# Patient Record
Sex: MALE | Race: BLACK OR AFRICAN AMERICAN | NOT HISPANIC OR LATINO | Employment: FULL TIME | URBAN - METROPOLITAN AREA
[De-identification: names, ages, dates, MRNs, and addresses within clinical notes are randomized per-mention and may not be internally consistent; named-entity substitution may affect disease eponyms.]

---
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
## 2018-01-12 VITALS — WEIGHT: 135.75 LBS | TEMPERATURE: 99.3 F

## 2018-01-12 NOTE — PROGRESS NOTES
Chief Complaint  He is an 23year old patient here for his PPD and HPV#9 today      Active Problems    1  Acute URI (465 9) (J06 9)    Current Meds   1  No Reported Medications Recorded    Allergies    1  No Known Drug Allergies    2   Seasonal    Plan  Encounter for immunization    · Gardasil 9 Intramuscular Suspension   · Tubersol 5 UNIT/0 1ML Intradermal Solution    Signatures   Electronically signed by : Bobby Pack MD; Jun 29 2016  4:07PM EST                       (Author)

## 2018-01-14 VITALS
WEIGHT: 137.4 LBS | RESPIRATION RATE: 16 BRPM | DIASTOLIC BLOOD PRESSURE: 85 MMHG | SYSTOLIC BLOOD PRESSURE: 125 MMHG | HEART RATE: 90 BPM | HEIGHT: 68 IN | BODY MASS INDEX: 20.82 KG/M2

## 2018-01-14 NOTE — PROGRESS NOTES
Assessment   1  No tobacco/smoke exposure  2  Encounter for preventive health examination (V70 0) (Z00 00)  3  ADD (attention deficit disorder) (314 00) (F98 8)    Plan  ADD (attention deficit disorder)    · Start: Dexmethylphenidate HCl ER 10 MG Oral Capsule Extended Release 24 Hour  (Focalin XR); TAKE 1 CAPSULE DAILY IN THE MORNING  Encounter for immunization    · 1,2 Administered:2  Gardasil 9 Intramuscular Suspension Prefilled Syringe;1 1,2      1 Amended By: Ricci Tierney; Jul 03 2017 1:53 PM EST   2 Amended By: Ricci Tierney; Jul 03 2017 2:12 PM EST    Discussion/Summary    BLOOD PRESSURE VERY SLIGHTLY ELEVATED,PATIENT DISCLOSED CONSUMPTION OF ENERGY DRINK  ENCOURAGED TO STOP CONSUMING [address]   [facility],[address]  [facility] TO ADD [facility]  Chief Complaint  21YEAR OLD PATIENT PRESENT FOR WELL VISIT  PER MOM HAS CONCERNS ABOUT ADD MEDICATION  History of Present Illness  HM, Adult Male: The patient is being seen for a health maintenance evaluation  General Health: The patient's health since the last visit is described as good  Lifestyle:  He consumes a diverse and healthy diet  He exercises regularly  He does not use tobacco    Screening:      Review of Systems    Constitutional: No fever or chills, feels well, no tiredness, no recent weight gain or weight loss  Eyes: No complaints of eye pain, no red eyes, no discharge from eyes, no itchy eyes  ENT: no complaints of earache, no hearing loss, no nosebleeds, no nasal discharge, no sore throat, no hoarseness  Cardiovascular: No complaints of slow heart rate, no fast heart rate, no chest pain, no palpitations, no leg claudication, no lower extremity  Respiratory: No complaints of shortness of breath, no wheezing, no cough, no SOB on exertion, no orthopnea or PND     Gastrointestinal: No complaints of abdominal pain, no constipation, no nausea or vomiting, no diarrhea or bloody stools  Genitourinary: No complaints of dysuria, no incontinence, no hesitancy, no nocturia, no genital lesion, no testicular pain  Musculoskeletal: No complaints of arthralgia, no myalgias, no joint swelling or stiffness, no limb pain or swelling  Integumentary: No complaints of skin rash or skin lesions, no itching, no skin wound, no dry skin  Neurological: No compliants of headache, no confusion, no convulsions, no numbness or tingling, no dizziness or fainting, no limb weakness, no difficulty walking  Psychiatric: Is not suicidal, no sleep disturbances, no anxiety or depression, no change in personality, no emotional problems  Endocrine: No complaints of proptosis, no hot flashes, no muscle weakness, no erectile dysfunction, no deepening of the voice, no feelings of weakness  Hematologic/Lymphatic: No complaints of swollen glands, no swollen glands in the neck, does not bleed easily, no easy bruising  Active Problems   1  Acute URI (465 9) (J06 9)  2  Strep throat (034 0) (J02 0)    Past Medical History    · History of Acute sinusitis (461 9) (J01 90)   · History of Acute URI (465 9) (J06 9)   · History of ADHD (attention deficit hyperactivity disorder) (314 01) (F90 9)   · History of Adopted child   · History of Allergic rhinitis (477 9) (J30 9)   · History of abdominal pain (V13 89) (W91 264)   · History of Holter monitoring (V45 89) (Z98 890)   · History of Learning problem (V40 0) (F81 9)   · History of No tobacco smoke exposure (V49 89) (Z78 9)   · History of Palpitations (785 1) (R00 2)   · History of Pharyngotonsillitis (465 8) (J03 90,J02  9)   · History of Puberty (V21 1) (Z00 3)   · History of Sprained ankle (845 00) (S93 409A)   · History of URI (upper respiratory infection) (465 9) (J06 9)   · History of Wrist pain (719 43) (M25 539)    Surgical History    · History of Hernia Repair   · History of Surgery Penis Circumcision Using Clamp/ Other Device    · History of Umbilical Hernia Repair - Child Under 5    Family History  Family History    · Adopted    Social History    · Activities: Soccer   · Adopted child   · Lives with parents   · Lives with parents ()   · Never a smoker   · No alcohol use   · No drug use   · No tobacco/smoke exposure   · Non drinker / no alcohol use   · Non-smoker (V49 89) (Z78 9)    Current Meds  1  No Reported Medications Recorded    Allergies   1  No Known Drug Allergies   2  Seasonal    Vitals   Recorded: 44WLZ3231 01:35PM Recorded: 36KTY6102 01:20PM   Heart Rate 90    Respiration 16    Systolic 389, LUE, Sitting    Diastolic 85, LUE, Sitting    Height  5 ft 8 in   Weight  137 lb 6 4 oz   BMI Calculated  20 89   BSA Calculated  1 74   BMI Percentile  25 %   2-20 Stature Percentile  29 %   2-20 Weight Percentile  23 %     Physical Exam    Constitutional   General appearance: No acute distress, well appearing and well nourished  Eyes   Conjunctiva and lids: No erythema, swelling or discharge  Pupils and irises: Equal, round, reactive to light  Ophthalmoscopic examination: Normal fundi and optic discs  Ears, Nose, Mouth, and Throat   External inspection of ears and nose: Normal     Otoscopic examination: Tympanic membranes translucent with normal light reflex  Canals patent without erythema  Hearing: Normal     Nasal mucosa, septum, and turbinates: Normal without edema or erythema  Lips, teeth, and gums: Normal, good dentition  Oropharynx: Normal with no erythema, edema, exudate or lesions  Neck   Neck: Supple, symmetric, trachea midline, no masses  Thyroid: Normal, no thyromegaly  Pulmonary   Respiratory effort: No increased work of breathing or signs of respiratory distress  Percussion of chest: Normal     Palpation of chest: Normal     Auscultation of lungs: Clear to auscultation  Cardiovascular   Palpation of heart: Normal PMI, no thrills      Auscultation of heart: Normal rate and rhythm, normal S1 and S2, no murmurs  Carotid pulses: 2+ bilaterally  Abdominal aorta: Normal     Femoral pulses: 2+ bilaterally  Pedal pulses: 2+ bilaterally  Examination of extremities for edema and/or varicosities: Normal     Chest   Breasts: Normal, no dimpling or skin changes appreciated  Palpation of breasts and axillae: Normal, no masses palpated  Chest: Normal     Abdomen   Abdomen: Non-tender, no masses  Liver and spleen: No hepatomegaly or splenomegaly  Examination for hernias: No hernias appreciated  Anus, perineum, and rectum: Normal sphincter tone, no masses, no prolapse  Stool sample for occult blood: Negative  Genitourinary   Scrotal contents: Normal testes, no masses  Penis: Normal, no lesions  Digital rectal exam of prostate: Normal size, no masses  Lymphatic   Palpation of lymph nodes in neck: No lymphadenopathy  Palpation of lymph nodes in axillae: No lymphadenopathy  Palpation of lymph nodes in groin: No lymphadenopathy  Palpation of lymph nodes in other areas: No lymphadenopathy  Musculoskeletal   Gait and station: Normal     Inspection/palpation of digits and nails: Normal without clubbing or cyanosis  Inspection/palpation of joints, bones, and muscles: Normal     Range of motion: Normal     Stability: Normal     Muscle strength/tone: Normal     Skin   Skin and subcutaneous tissue: Normal without rashes or lesions  Palpation of skin and subcutaneous tissue: Normal turgor  Neurologic   Cranial nerves: Cranial nerves 2-12 intact  Reflexes: 2+ and symmetric  Sensation: No sensory loss  Psychiatric   Judgment and insight: Normal     Orientation to person, place and time: Normal     Recent and remote memory: Intact      Mood and affect: Normal        Signatures   Electronically signed by : Jenise Halsted, MD; Jul  3 2017  2:12PM EST                       (Author)

## 2018-11-24 ENCOUNTER — OFFICE VISIT (OUTPATIENT)
Dept: URGENT CARE | Facility: CLINIC | Age: 18
End: 2018-11-24
Payer: COMMERCIAL

## 2018-11-24 ENCOUNTER — HOSPITAL ENCOUNTER (INPATIENT)
Facility: HOSPITAL | Age: 18
LOS: 3 days | Discharge: HOME | DRG: 200 | End: 2018-11-28
Attending: SURGERY | Admitting: SURGERY
Payer: COMMERCIAL

## 2018-11-24 ENCOUNTER — HOSPITAL ENCOUNTER (EMERGENCY)
Facility: HOSPITAL | Age: 18
End: 2018-11-24
Attending: EMERGENCY MEDICINE | Admitting: EMERGENCY MEDICINE
Payer: COMMERCIAL

## 2018-11-24 ENCOUNTER — APPOINTMENT (EMERGENCY)
Dept: RADIOLOGY | Facility: HOSPITAL | Age: 18
End: 2018-11-24
Payer: COMMERCIAL

## 2018-11-24 VITALS
OXYGEN SATURATION: 99 % | SYSTOLIC BLOOD PRESSURE: 132 MMHG | DIASTOLIC BLOOD PRESSURE: 79 MMHG | HEART RATE: 65 BPM | RESPIRATION RATE: 19 BRPM | TEMPERATURE: 98.5 F

## 2018-11-24 VITALS
SYSTOLIC BLOOD PRESSURE: 110 MMHG | DIASTOLIC BLOOD PRESSURE: 88 MMHG | TEMPERATURE: 98.9 F | HEIGHT: 69 IN | WEIGHT: 130 LBS | HEART RATE: 76 BPM | BODY MASS INDEX: 19.26 KG/M2 | OXYGEN SATURATION: 98 %

## 2018-11-24 DIAGNOSIS — R04.2 HEMOPTYSIS: ICD-10-CM

## 2018-11-24 DIAGNOSIS — S27.0XXA TRAUMATIC PNEUMOTHORAX, INITIAL ENCOUNTER: Primary | ICD-10-CM

## 2018-11-24 DIAGNOSIS — R10.11 RIGHT UPPER QUADRANT ABDOMINAL PAIN: ICD-10-CM

## 2018-11-24 DIAGNOSIS — R07.81 RIB PAIN: Primary | ICD-10-CM

## 2018-11-24 LAB
ANION GAP SERPL CALCULATED.3IONS-SCNC: 10 MMOL/L (ref 4–13)
BASOPHILS # BLD AUTO: 0.03 THOUSANDS/ΜL (ref 0–0.1)
BASOPHILS NFR BLD AUTO: 0 % (ref 0–1)
BUN SERPL-MCNC: 12 MG/DL (ref 5–25)
CALCIUM SERPL-MCNC: 9.3 MG/DL (ref 8.3–10.1)
CHLORIDE SERPL-SCNC: 103 MMOL/L (ref 100–108)
CO2 SERPL-SCNC: 28 MMOL/L (ref 21–32)
CREAT SERPL-MCNC: 1.18 MG/DL (ref 0.6–1.3)
EOSINOPHIL # BLD AUTO: 0.13 THOUSAND/ΜL (ref 0–0.61)
EOSINOPHIL NFR BLD AUTO: 1 % (ref 0–6)
ERYTHROCYTE [DISTWIDTH] IN BLOOD BY AUTOMATED COUNT: 13.3 % (ref 11.6–15.1)
GFR SERPL CREATININE-BSD FRML MDRD: 102 ML/MIN/1.73SQ M
GLUCOSE SERPL-MCNC: 85 MG/DL (ref 65–140)
HCT VFR BLD AUTO: 51.9 % (ref 36.5–49.3)
HGB BLD-MCNC: 17 G/DL (ref 12–17)
IMM GRANULOCYTES # BLD AUTO: 0.02 THOUSAND/UL (ref 0–0.2)
IMM GRANULOCYTES NFR BLD AUTO: 0 % (ref 0–2)
LYMPHOCYTES # BLD AUTO: 2.04 THOUSANDS/ΜL (ref 0.6–4.47)
LYMPHOCYTES NFR BLD AUTO: 18 % (ref 14–44)
MCH RBC QN AUTO: 30.1 PG (ref 26.8–34.3)
MCHC RBC AUTO-ENTMCNC: 32.8 G/DL (ref 31.4–37.4)
MCV RBC AUTO: 92 FL (ref 82–98)
MONOCYTES # BLD AUTO: 0.6 THOUSAND/ΜL (ref 0.17–1.22)
MONOCYTES NFR BLD AUTO: 5 % (ref 4–12)
NEUTROPHILS # BLD AUTO: 8.61 THOUSANDS/ΜL (ref 1.85–7.62)
NEUTS SEG NFR BLD AUTO: 76 % (ref 43–75)
NRBC BLD AUTO-RTO: 0 /100 WBCS
PLATELET # BLD AUTO: 325 THOUSANDS/UL (ref 149–390)
PMV BLD AUTO: 11.2 FL (ref 8.9–12.7)
POTASSIUM SERPL-SCNC: 4.6 MMOL/L (ref 3.5–5.3)
RBC # BLD AUTO: 5.65 MILLION/UL (ref 3.88–5.62)
SODIUM SERPL-SCNC: 141 MMOL/L (ref 136–145)
WBC # BLD AUTO: 11.43 THOUSAND/UL (ref 4.31–10.16)

## 2018-11-24 PROCEDURE — 99284 EMERGENCY DEPT VISIT MOD MDM: CPT

## 2018-11-24 PROCEDURE — 99213 OFFICE O/P EST LOW 20 MIN: CPT | Performed by: PHYSICIAN ASSISTANT

## 2018-11-24 PROCEDURE — 71046 X-RAY EXAM CHEST 2 VIEWS: CPT

## 2018-11-24 PROCEDURE — 85025 COMPLETE CBC W/AUTO DIFF WBC: CPT | Performed by: NURSE PRACTITIONER

## 2018-11-24 PROCEDURE — 99285 EMERGENCY DEPT VISIT HI MDM: CPT

## 2018-11-24 PROCEDURE — 36415 COLL VENOUS BLD VENIPUNCTURE: CPT | Performed by: NURSE PRACTITIONER

## 2018-11-24 PROCEDURE — 93005 ELECTROCARDIOGRAM TRACING: CPT

## 2018-11-24 PROCEDURE — 80048 BASIC METABOLIC PNL TOTAL CA: CPT | Performed by: NURSE PRACTITIONER

## 2018-11-24 PROCEDURE — 71100 X-RAY EXAM RIBS UNI 2 VIEWS: CPT

## 2018-11-24 PROCEDURE — 99220 PR INITIAL OBSERVATION CARE/DAY 70 MINUTES: CPT | Performed by: SURGERY

## 2018-11-24 RX ORDER — HYDROCODONE BITARTRATE AND ACETAMINOPHEN 5; 325 MG/1; MG/1
1 TABLET ORAL ONCE
Status: COMPLETED | OUTPATIENT
Start: 2018-11-24 | End: 2018-11-24

## 2018-11-24 RX ADMIN — HYDROCODONE BITARTRATE AND ACETAMINOPHEN 1 TABLET: 5; 325 TABLET ORAL at 18:54

## 2018-11-24 NOTE — EMTALA/ACUTE CARE TRANSFER
[address] [address]  Dept: [phone]      NKXXAI TRANSFER CONSENT    NAME Lorna Downing                                         2000                              MRN [id number]    I have been informed of my rights regarding examination, treatment, and transfer   by Dr West Carvajal DO    Benefits: Specialized equipment and/or services available at the receiving facility (Include comment)________________________    Risks: Potential for delay in receiving treatment      Consent for Transfer:  I acknowledge that my medical condition has been evaluated and explained to me by the emergency department physician or other qualified medical person and/or my attending physician, who has recommended that I be transferred to the service of  Accepting Physician: Du Beckwith at [address] Name, [address] : [facility]  The above potential benefits of such transfer, the potential risks associated with such transfer, and the probable risks of not being transferred have been explained to me, and I fully understand them  The doctor has explained that, in my case, the benefits of transfer outweigh the risks  I agree to be transferred  I authorize the performance of emergency medical procedures and treatments upon me in both transit and upon arrival at the receiving facility  Additionally, I authorize the release of any and all medical records to the receiving facility and request they be transported with me, if possible  I understand that the safest mode of transportation during a medical emergency is an ambulance and that the Hospital advocates the use of this mode of transport  Risks of traveling to the receiving facility by car, including absence of medical control, life sustaining equipment, such as oxygen, and medical personnel has been explained to me and I fully understand them      (CONSTANTINE CORRECT BOX BELOW)  [  ]  I consent to the stated transfer and to be transported by ambulance/helicopter  [  ]  I consent to the stated transfer, but refuse transportation by ambulance and accept full responsibility for my transportation by car  I understand the risks of non-ambulance transfers and I exonerate the Hospital and its staff from any deterioration in my condition that results from this refusal     X___________________________________________    DATE  18  TIME________  Signature of patient or legally responsible individual signing on patient behalf           RELATIONSHIP TO PATIENT_________________________          Provider Certification    NAME Jeovany Ness                                         2000                              MRN [id number]    A medical screening exam was performed on the above named patient  Based on the examination:    Condition Necessitating Transfer The encounter diagnosis was Traumatic pneumothorax, initial encounter  Patient Condition: The patient has been stabilized such that within reasonable medical probability, no material deterioration of the patient condition or the condition of the unborn child(angel) is likely to result from the transfer    Reason for Transfer: Level of Care needed not available at this facility    Transfer Requirements: Facility [facility]   · Space available and qualified personnel available for treatment as acknowledged by Sari Esquivel  · Agreed to accept transfer and to provide appropriate medical treatment as acknowledged by       Danay Haider  · Appropriate medical records of the examination and treatment of the patient are provided at the time of transfer   [address], [address] _______  · Transfer will be performed by qualified personnel from [facility]  and appropriate transfer equipment as required, including the use of necessary and appropriate life support measures      Provider Certification: I have examined the patient and explained the following risks and benefits of being transferred/refusing transfer to the patient/family:  General risk, such as traffic hazards, adverse weather conditions, rough terrain or turbulence, possible failure of equipment (including vehicle or aircraft), or consequences of actions of persons outside the control of the transport personnel      Based on these reasonable risks and benefits to the patient and/or the unborn child(angel), and based upon the information available at the time of the patients examination, I certify that the medical benefits reasonably to be expected from the provision of appropriate medical treatments at another medical facility outweigh the increasing risks, if any, to the individuals medical condition, and in the case of labor to the unborn child, from effecting the transfer      X____________________________________________ DATE 11/24/18        TIME_______      ORIGINAL - SEND TO MEDICAL RECORDS   COPY - SEND WITH PATIENT DURING TRANSFER

## 2018-11-24 NOTE — PATIENT INSTRUCTIONS
1  Rib injury/Hemoptysis/RUQ pain  -At this time I feel that you need further work-up than what can be provided here   Therefore I recommend you go to the ER for further evaluation

## 2018-11-24 NOTE — ED PROVIDER NOTES
History  Chief Complaint   Patient presents with    Chest Pain     Patient complains of chest pain after a collision with a goal keeper while playing soccer  Pain gets worse with movement  Patient stated he had a "coughing fit" during which he coughed up blood  pt was seen at urgent care and c/o abdominal pain when they assessed his abdomen  10-year-old male patient presents here with a chief complaint of hemoptysis yesterday  He reports that he was playing soccer collided with another player developed right rib pain after that  The injury occurred yesterday  He did have 1 episode of mild blood-tinged hemoptysis but none since that time  No episodes today  Went to urgent care to be evaluated was sent to the emergency department  He has some right lateral rib tenderness and some decreased breath sounds on the right but no respiratory distress no tachypnea no crepitus  None       History reviewed  No pertinent past medical history  Past Surgical History:   Procedure Laterality Date    HERNIA REPAIR      WISDOM TOOTH EXTRACTION         Family History   Problem Relation Age of Onset    No Known Problems Mother     No Known Problems Father      I have reviewed and agree with the history as documented  Social History   Substance Use Topics    Smoking status: Light Tobacco Smoker     Types: E-Cigarettes    Smokeless tobacco: Never Used      Comment: Occasional smoker    Alcohol use 1 2 oz/week     2 Glasses of wine per week        Review of Systems   Constitutional: Negative for diaphoresis, fatigue and fever  HENT: Negative for congestion, ear pain, nosebleeds and sore throat  Eyes: Negative for photophobia, pain, discharge and visual disturbance  Respiratory: Negative for cough, choking, chest tightness, shortness of breath and wheezing  Cardiovascular: Positive for chest pain (Chest wall)  Negative for palpitations     Gastrointestinal: Negative for abdominal distention, abdominal pain, diarrhea and vomiting  Genitourinary: Negative for dysuria, flank pain and frequency  Musculoskeletal: Negative for back pain, gait problem and joint swelling  Skin: Negative for color change and rash  Neurological: Negative for dizziness, syncope and headaches  Psychiatric/Behavioral: Negative for behavioral problems and confusion  The patient is not nervous/anxious  All other systems reviewed and are negative  Physical Exam  Physical Exam   Constitutional: He is oriented to person, place, and time  He appears well-developed and well-nourished  HENT:   Head: Normocephalic and atraumatic  Eyes: Pupils are equal, round, and reactive to light  Neck: Normal range of motion  Neck supple  Cardiovascular: Normal rate, regular rhythm, normal heart sounds and normal pulses  PMI is not displaced  Pulmonary/Chest: Effort normal  No respiratory distress  He has decreased breath sounds in the right upper field  He exhibits tenderness (Right lateral just underneath the axilla)  Abdominal: Soft  He exhibits no distension  There is no guarding  Musculoskeletal: Normal range of motion  Lymphadenopathy:     He has no cervical adenopathy  Neurological: He is alert and oriented to person, place, and time  Skin: Skin is warm and dry  No rash noted  He is not diaphoretic  No pallor  Psychiatric: He has a normal mood and affect  Vitals reviewed        Vital Signs  ED Triage Vitals   Temperature Pulse Respirations Blood Pressure SpO2   11/24/18 1718 11/24/18 1533 11/24/18 1533 11/24/18 1533 11/24/18 1533   98 5 °F (36 9 °C) 78 18 131/81 100 %      Temp src Heart Rate Source Patient Position - Orthostatic VS BP Location FiO2 (%)   -- 11/24/18 1533 11/24/18 1533 11/24/18 1533 --    Monitor Sitting Right arm       Pain Score       11/24/18 1533       5           Vitals:    11/24/18 1800 11/24/18 1855 11/24/18 2027 11/24/18 2130   BP: 145/86 148/64 136/96 132/79   Pulse: 83 75 69 65 Patient Position - Orthostatic VS:   Lying        Visual Acuity      ED Medications  Medications   HYDROcodone-acetaminophen (NORCO) 5-325 mg per tablet 1 tablet (1 tablet Oral Given 11/24/18 1854)       Diagnostic Studies  Results Reviewed     Procedure Component Value Units Date/Time    Basic metabolic panel [[id number]] Collected:  11/24/18 1901    Lab Status:  Final result Specimen:  Blood from Arm, Right Updated:  11/24/18 1931     Sodium 141 mmol/L      Potassium 4 6 mmol/L      Chloride 103 mmol/L      CO2 28 mmol/L      ANION GAP 10 mmol/L      BUN 12 mg/dL      Creatinine 1 18 mg/dL      Glucose 85 mg/dL      Calcium 9 3 mg/dL      eGFR 102 ml/min/1 73sq m     Narrative:         National Kidney Disease Education Program recommendations are as follows:  GFR calculation is accurate only with a steady state creatinine  Chronic Kidney disease less than 60 ml/min/1 73 sq  meters  Kidney failure less than 15 ml/min/1 73 sq  meters      CBC and differential [[id number]]  (Abnormal) Collected:  11/24/18 1848    Lab Status:  Final result Specimen:  Blood from Arm, Right Updated:  11/24/18 1900     WBC 11 43 (H) Thousand/uL      RBC 5 65 (H) Million/uL      Hemoglobin 17 0 g/dL      Hematocrit 51 9 (H) %      MCV 92 fL      MCH 30 1 pg      MCHC 32 8 g/dL      RDW 13 3 %      MPV 11 2 fL      Platelets 140 Thousands/uL      nRBC 0 /100 WBCs      Neutrophils Relative 76 (H) %      Immat GRANS % 0 %      Lymphocytes Relative 18 %      Monocytes Relative 5 %      Eosinophils Relative 1 %      Basophils Relative 0 %      Neutrophils Absolute 8 61 (H) Thousands/µL      Immature Grans Absolute 0 02 Thousand/uL      Lymphocytes Absolute 2 04 Thousands/µL      Monocytes Absolute 0 60 Thousand/µL      Eosinophils Absolute 0 13 Thousand/µL      Basophils Absolute 0 03 Thousands/µL                  XR chest 2 views   ED Interpretation by GEMINI Caldera (11/24 1753)   Mild pneumothorax      XR ribs 2 vw right   ED Interpretation by GEMINI Malcolm (11/24 2663)   No rib fracture appreciated, however patient has pneumothorax  Procedures  Procedures       Phone Contacts  ED Phone Contact    ED Course                               MDM  Number of Diagnoses or Management Options  Traumatic pneumothorax, initial encounter: new and requires workup  Diagnosis management comments: At this point I do not see any rib fractures but this is trauma related  Nasal cannula placed on conservatively  Amount and/or Complexity of Data Reviewed  Clinical lab tests: ordered and reviewed  Tests in the radiology section of CPT®: reviewed and ordered  Obtain history from someone other than the patient: yes  Discuss the patient with other providers: yes  Independent visualization of images, tracings, or specimens: yes    Patient Progress  Patient progress: stable    CritCare Time    Disposition  Final diagnoses:   Traumatic pneumothorax, initial encounter - Right-sided     Time reflects when diagnosis was documented in both MDM as applicable and the Disposition within this note     Time User Action Codes Description Comment    11/24/2018  5:54 PM Charissa Peterson Add [S27  0XXA] Traumatic pneumothorax, initial encounter     11/24/2018  5:54 PM Charissa Peterson Modify [S27  0XXA] Traumatic pneumothorax, initial encounter Right-sided      ED Disposition     ED Disposition Condition Comment    Transfer to Another [address] [place] [address] should be transferred out to B        MD Documentation      Most Recent Value   Patient Condition  The patient has been stabilized such that within reasonable medical probability, no material deterioration of the patient condition or the condition of the unborn child(angel) is likely to result from the transfer   Reason for Transfer  Level of Care needed not available at this facility   Benefits of Transfer  Specialized equipment and/or services available at the receiving facility (Include comment)________________________   Risks of Transfer  Potential for delay in receiving treatment   Accepting Physician  [facility] Name, [address]    (Name & Tel number)  Billy Weems by Lydia and Unit #)  [facility]   Sending MD Christopher/Idris   Provider Certification  General risk, such as traffic hazards, adverse weather conditions, rough terrain or turbulence, possible failure of equipment (including vehicle or aircraft), or consequences of actions of persons outside the control of the transport personnel      RN Documentation      Most [facility] Name, [address]   [facility]    (Name & Tel number)  Billy Weems by Lydia and Unit #)  [facility]      Follow-up Information    None         There are no discharge medications for this patient  No discharge procedures on file      ED Provider  Electronically Signed by           GEMINI Clemente  11/25/18 0003

## 2018-11-24 NOTE — PROGRESS NOTES
[facility] [facility]        NAME: Lance Mejia is a 19 y o  male  : 2000    MRN: [id number]  DATE: 2018  TIME: 2:40 PM    Assessment and Plan   Rib pain [R07 81]  1  Rib pain     2  Hemoptysis     3  Right upper quadrant abdominal pain           Patient Instructions     1  Rib injury/Hemoptysis/RUQ pain  -At this time I feel that you need further work-up than what can be provided here  Therefore I recommend you go to the ER for further evaluation    Chief Complaint     Chief Complaint   Patient presents with    Chest Pain     Started yesterday after pt  collided with  during game  right side of chest  Coughed up blood last night  Taking ibuprofen for pain  Can't twist torso  Coughing fits will bring up blood  History of Present Illness       Patient is a 78-year-old male who presents today for evaluation of right-sided rib pain  Patient states that yesterday, while playing in a soccer game, he got hit in the right side of his ribs by the soccer goalie's knee  Patient states that he continues to have pain to the right side of his rib patient states that he has been coughing up blood  He states that he feels he is unable to fully take a deep breath in and out  The patient states that whenever he has a coughing fit, he coughs up blood  Patient rates his pain as a 7/10  He also admits having some pain to his right upper abdomen as well  Review of Systems   Review of Systems   Constitutional: Negative for chills and fever  Respiratory: Positive for cough  Negative for shortness of breath  Cardiovascular: Positive for chest pain  Gastrointestinal: Positive for abdominal pain  Negative for vomiting  Current Medications     No current outpatient prescriptions on file      Current Allergies     Allergies as of 2018    (No Known Allergies)            The following portions of the patient's history were reviewed and updated as appropriate: allergies, current medications, past family history, past medical history, past social history, past surgical history and problem list      History reviewed  No pertinent past medical history  Past Surgical History:   Procedure Laterality Date    HERNIA REPAIR      WISDOM TOOTH EXTRACTION         Family History   Problem Relation Age of Onset    No Known Problems Mother     No Known Problems Father          Medications have been verified  Objective   /88 (BP Location: Left arm, Patient Position: Sitting, Cuff Size: Adult)   Pulse 76   Temp 98 9 °F (37 2 °C) (Temporal)   Ht 5' 9" (1 753 m)   Wt 59 kg (130 lb)   SpO2 98%   BMI 19 20 kg/m²        Physical Exam     Physical Exam   Constitutional: He appears well-developed and well-nourished  No distress  Cardiovascular: Normal rate, regular rhythm and normal heart sounds  Pulmonary/Chest: Effort normal and breath sounds normal  He exhibits tenderness  Abdominal: Soft  There is tenderness in the right upper quadrant  There is no rebound and no guarding  Nursing note and vitals reviewed

## 2018-11-25 ENCOUNTER — APPOINTMENT (OUTPATIENT)
Dept: RADIOLOGY | Facility: HOSPITAL | Age: 18
DRG: 200 | End: 2018-11-25
Payer: COMMERCIAL

## 2018-11-25 PROBLEM — S27.0XXA TRAUMATIC PNEUMOTHORAX: Status: ACTIVE | Noted: 2018-11-25

## 2018-11-25 PROCEDURE — 71046 X-RAY EXAM CHEST 2 VIEWS: CPT

## 2018-11-25 PROCEDURE — 99232 SBSQ HOSP IP/OBS MODERATE 35: CPT | Performed by: SURGERY

## 2018-11-25 RX ORDER — KETOROLAC TROMETHAMINE 30 MG/ML
15 INJECTION, SOLUTION INTRAMUSCULAR; INTRAVENOUS EVERY 6 HOURS PRN
Status: ACTIVE | OUTPATIENT
Start: 2018-11-25 | End: 2018-11-26

## 2018-11-25 RX ORDER — ACETAMINOPHEN 325 MG/1
975 TABLET ORAL EVERY 8 HOURS SCHEDULED
Status: DISCONTINUED | OUTPATIENT
Start: 2018-11-25 | End: 2018-11-28 | Stop reason: HOSPADM

## 2018-11-25 RX ADMIN — ENOXAPARIN SODIUM 40 MG: 40 INJECTION SUBCUTANEOUS at 15:58

## 2018-11-25 RX ADMIN — ACETAMINOPHEN 975 MG: 325 TABLET, FILM COATED ORAL at 00:34

## 2018-11-25 RX ADMIN — ACETAMINOPHEN 975 MG: 325 TABLET, FILM COATED ORAL at 13:53

## 2018-11-25 RX ADMIN — ACETAMINOPHEN 975 MG: 325 TABLET, FILM COATED ORAL at 05:46

## 2018-11-25 NOTE — SOCIAL WORK
Met with pt and explained CM role  Pt lives with his mom, dad, and 2 sisters in a 3 story house with 3 ETTA and BR and BD on 2nd floor  Pt was independent PTA and was able to drive  Pt's PCP is Dr Abner Tony  No DME avail  No hx of HHC or rehab  No hx of mental health issues or drug use  Pt stated that he uses alcohol less than 2 times a week and drinks "very light"  Pt has a prescription plan and uses [facility] in [place] for his prescriptions  Primary contact is pt's mom Jack Seth, contact # [phone]  Pt's mom will provide pt with transportation home upon discharge  Pt does not have a POA  CM reviewed d/c planning process including the following: identifying help at home, patient preference for d/c planning needs, Discharge Lounge, [facility] Meds to Bed program, availability of treatment team to discuss questions or concerns patient and/or family may have regarding understanding medications and recognizing signs and symptoms once discharged  CM also encouraged patient to follow up with all recommended appointments after discharge  Patient advised of importance for patient and family to participate in managing patients medical well being  Patient/caregiver received discharge checklist  Content reviewed  Patient/caregiver encouraged to participate in discharge plan of care prior to discharge home

## 2018-11-25 NOTE — DISCHARGE SUMMARY
Discharge Summary - Sheila Duff 19 y o  male MRN: [id number]    Unit/Bed#: CW3 343-01 Encounter: [id number]    Admission Date:   Admission Orders     Ordered        11/25/18 0005  Place in Observation  Once               Admitting Diagnosis: Chest pain [R07 9]    HPI: 22 yo M who initially presented to [facility] on 11/24 for right sided rib pain after being struck by a knee while playing soccer  Patient had shortness of breath and chest wall pain which was not improving  Procedures Performed: No orders of the defined types were placed in this encounter  Summary of Hospital Course: Patient found to have a mild pneumothorax on CXR on 11/24 and was transferred to [facility] for possible chest tube  Patient's PTX increased based on CXR done on 11/26 and a R-sided chest tube was placed using conscious sedation  Patient tolerated the procedure well and the chest tube was put to wall suction for approximately 24h with no air leak and improved appearance on CXR done on 11/27  The chest tube was placed on water seal around 1200 on the 27th and a repeat CXR done at 1800 was normal so the chest tube was removed  Patient did well overnight without the chest tube and a final CXR showed no recurrence of the PTX  The patient was discharged home in good condition  Significant Findings, Care, Treatment and Services Provided: R-sided traumatic PTX with associated chest tube  Complications: none    Discharge Diagnosis: R traumatic PTX    Resolved Problems  Date Reviewed: 11/24/2018    None          Condition at Discharge: good         Discharge instructions/Information to patient and family:   See after visit summary for information provided to patient and family  Provisions for Follow-Up Care:  See after visit summary for information related to follow-up care and any pertinent home health orders        PCP: Mino Ross MD    Disposition: Home    Planned Readmission: No    Discharge Statement   I spent 15 minutes discharging the patient  This time was spent on the day of discharge  I had direct contact with the patient on the day of discharge  Additional documentation is required if more than 30 minutes were spent on discharge  Discharge Medications:  See after visit summary for reconciled discharge medications provided to patient and family

## 2018-11-25 NOTE — H&P
H&P Exam - Trauma   Rebekah Multani 19 y o  male MRN: [id number]  Unit/Bed#: ED 02 Encounter: [id number]    Assessment/Plan   Trauma Alert: Evaluation  Model of Arrival: Ambulance  Trauma Team: Attending Boogie Pascual, Residents Asa Romero and Fellow Lou  Consultants: None    Trauma Active Problems: Right traumatic pneumothorax    Trauma Plan: Admit for observation and repeat cxr    Chief Complaint: Mild chest wall pain    History of Present Illness   HPI:  Rebekah Multani is a 19 y o  male who presents with a mild right pneumothorax of the Right lung after being kneed in the chest playing soccer on 11/23  Patient felt as though the wind were knocked out of him and on 11/24 his symptoms persisted and he presented to [facility] for evaluation for a broken rib  Due to nature on the ptx patient was trauma transfer for possible chest tube  Mechanism:Fall    Review of Systems   Constitutional: Negative for chills, fatigue and fever  HENT: Negative for sore throat  Eyes: Negative for visual disturbance  Respiratory: Negative for cough, chest tightness and shortness of breath  Cardiovascular: Negative for chest pain  Gastrointestinal: Negative for abdominal pain, constipation, diarrhea, nausea and vomiting  Genitourinary: Negative for difficulty urinating, dysuria and hematuria  Musculoskeletal: Negative for arthralgias  Skin: Negative for rash  Neurological: Negative for syncope, weakness and headaches  Hematological: Negative for adenopathy  Psychiatric/Behavioral: Negative for agitation and behavioral problems  All other systems reviewed and are negative  Historical Information   History is unobtainable from the patient due to none  Efforts to obtain history included the following sources: family member    History reviewed  No pertinent past medical history    Past Surgical History:   Procedure Laterality Date    HERNIA REPAIR      WISDOM TOOTH EXTRACTION       Social History   History   Alcohol Use  1 2 oz/week    2 Glasses of wine per week     History   Drug Use No     History   Smoking Status    Light Tobacco Smoker    Types: E-Cigarettes   Smokeless Tobacco    Never Used     Comment: Occasional smoker     Immunization History   Administered Date(s) Administered    DTaP 5 01/06/1999, 03/10/1999, 04/21/1999, 08/25/1999, 10/20/2003    HPV Quadrivalent 04/07/2014    HPV9 06/29/2016, 07/03/2017    Hep A, adult 08/09/2011, 04/07/2014    Hep B, adult 06/07/1999, 11/15/1999, 12/20/1999, 03/19/2009    IPV 01/06/1999, 03/10/1999, 04/21/1999, 08/25/1999    Influenza TIV (IM) 10/23/2007, 10/03/2009, 10/28/2010    MMR 06/07/1999, 03/17/2003    Meningococcal, Unknown Serogroups 08/20/2009, 04/07/2014    Tdap 03/19/2009    Tuberculin Skin Test-PPD Intradermal 06/29/2016    Varicella 07/24/2004, 08/20/2009     Last Tetanus: unknown  Family History: Non-contributory  Unable to obtain/limited by none      Meds/Allergies   all current active meds have been reviewed    No Known Allergies      PHYSICAL EXAM    PE limited by: none    Objective   Vitals:   First set: Temperature: 97 8 °F (36 6 °C) (11/24/18 2350)  Pulse: 74 (11/24/18 2350)  Respirations: 18 (11/24/18 2350)  Blood Pressure: 140/88 (11/24/18 2350)    Primary Survey:   (A) Airway: patent  (B) Breathing: ctab  (C) Circulation: Pulses:   normal  (D) Disabliity:  GCS Total:  15  (E) Expose:  Completed    Secondary Survey: (Click on Physical Exam tab above)  Physical Exam   Constitutional: He is oriented to person, place, and time  He appears well-developed and well-nourished  HENT:   Head: Normocephalic and atraumatic  Eyes: Pupils are equal, round, and reactive to light  Conjunctivae are normal    Neck: Normal range of motion  Cardiovascular: Normal rate, regular rhythm and normal heart sounds  Pulmonary/Chest: Effort normal  He has decreased breath sounds in the right upper field  He exhibits tenderness  Abdominal: Soft   Bowel sounds are normal  He exhibits no distension  There is no tenderness  Musculoskeletal: Normal range of motion  He exhibits no tenderness  Neurological: He is alert and oriented to person, place, and time  Skin: Skin is warm and dry  Psychiatric: He has a normal mood and affect  Nursing note and vitals reviewed  Invasive Devices     Peripheral Intravenous Line            Peripheral IV 11/24/18 Right Antecubital less than 1 day                Lab Results: Results: I have personally reviewed pertinent reports      Imaging/EKG Studies: Chest X-Ray: Mild right sided ptx  Other Studies: none    Code Status: No Order  Advance Directive and Living Will:      Power of :    POLST:

## 2018-11-25 NOTE — RESPIRATORY THERAPY NOTE
RT Protocol Note  Juan Manuel Escudero 19 y o  male MRN: [id number]  Unit/Bed#: CW3 343-01 Encounter: [id number]    Assessment    Active Problems:    Traumatic pneumothorax      Home Pulmonary Medications:         History reviewed  No pertinent past medical history  Social History     Social History    Marital status: Single     Spouse name: N/A    Number of children: N/A    Years of education: N/A     Social History Main Topics    Smoking status: Light Tobacco Smoker     Types: E-Cigarettes    Smokeless tobacco: Never Used      Comment: Occasional smoker    Alcohol use 1 2 oz/week     2 Glasses of wine per week    Drug use: No    Sexual activity: Not Asked     Other Topics Concern    None     Social History Narrative    None       Subjective         Objective    Physical Exam:   Assessment Type: (P) Assess only  Bilateral Breath Sounds: (P) Clear  Cough: (P) None    Vitals:  Blood pressure 125/74, pulse (P) 71, temperature 98 3 °F (36 8 °C), temperature source Oral, resp  rate 12, height 5' 9" (1 753 m), weight 62 2 kg (137 lb 2 oz), SpO2 (P) 100 %  Imaging and other studies: I have personally reviewed pertinent reports  Plan    Respiratory Plan: (P) No distress/Pulmonary history        Resp Comments: (P) Pt  evaluated for resp protocol  BS= clear and well aerated  Pt  in no distress on RMA  GwR4=190%  Pt  instructed on use of IS  No further resp intervention required at this time

## 2018-11-25 NOTE — PROGRESS NOTES
Progress Note Slade Bella 2000, 19 y o  male MRN: [id number]    Unit/Bed#: CW3 343-01 Encounter: [id number]    Primary Care Provider: Khai Weaver MD   Date and time admitted to hospital: 11/24/2018 11:49 PM        Traumatic pneumothorax   Assessment & Plan    Admitted to obs, repeat cxr at 0700 11/25  - encourage IS and resp protocol  - tylenol and toradol for pain               Subjective/Objective   Chief Complaint: None, feeling well    Subjective: well appearing NAD, comfortable on room are    Objective:     Meds/Allergies   No prescriptions prior to admission  Vitals: Blood pressure 125/74, pulse 71, temperature 98 3 °F (36 8 °C), temperature source Oral, resp  rate 12, height 5' 9" (1 753 m), weight 62 2 kg (137 lb 2 oz), SpO2 100 %  Body mass index is 20 25 kg/m²  SpO2: SpO2: 100 %    ABG: No results found for: PHART, RGP2ZVA, PO2ART, JBI2NXN, Y0TBIUYR, BEART, SOURCE      Intake/Output Summary (Last 24 hours) at 11/25/18 5462  Last data filed at 11/25/18 0546   Gross per 24 hour   Intake              180 ml   Output                0 ml   Net              180 ml       Invasive Devices     Peripheral Intravenous Line            Peripheral IV 11/24/18 Right Antecubital less than 1 day                Nutrition/GI Proph/Bowel Reg: Reg    Physical Exam:   Physical Exam   Constitutional: He is oriented to person, place, and time  He appears well-developed and well-nourished  HENT:   Head: Normocephalic and atraumatic  Eyes: Conjunctivae and EOM are normal  No scleral icterus  Neck: Normal range of motion  Neck supple  Cardiovascular: Normal rate and regular rhythm  No murmur heard  Pulmonary/Chest: Effort normal  No respiratory distress  He has no decreased breath sounds  He exhibits no tenderness  Abdominal: Soft  Bowel sounds are normal  There is no tenderness  Musculoskeletal: Normal range of motion  He exhibits no tenderness     Neurological: He is alert and oriented to person, place, and time  Skin: Skin is warm and dry  Psychiatric: He has a normal mood and affect  His behavior is normal    Nursing note and vitals reviewed  Lab Results: Results: I have personally reviewed pertinent reports  Imaging/EKG Studies: Results: I have personally reviewed pertinent reports      Other Studies: none  VTE Prophylaxis: none

## 2018-11-25 NOTE — ASSESSMENT & PLAN NOTE
Admitted to obs, repeat cxr at 0700 11/25  - encourage IS and resp protocol  - tylenol and toradol for pain

## 2018-11-25 NOTE — UTILIZATION REVIEW
[address]  Utilization Review Department  Phone: [phone]; Fax [phone]  [email or web address]  ATTENTION: Please call with any questions or concerns to [phone]  and carefully listen to the prompts so that you are directed to the right person  Send all requests for admission clinical reviews, approved or denied determinations and any other requests to fax [phone]  All voicemails are confidential       Initial Clinical Review    Admission: Date/Time/Statement: 11/25/18 @ 0004 -- OBS    Orders Placed This Encounter   Procedures    Place in Observation     Standing Status:   Standing     Number of Occurrences:   1     Order Specific Question:   Admitting Physician     Answer:   Ryanne Haque     Order Specific Question:   Level of Care     Answer:   Med Surg [16]       ED: Date/Time/Mode of Arrival:  Tx from [id number] [facility] ED  ED Arrival Information     Expected Arrival [facility]uity Means of Arrival Escorted By Service Admission Type    - 11/24/2018 23:49 Immediate Ambulance [facility] Trauma Urgent    Arrival Complaint    Traumatic Pneumothorax          Chief Complaint:   Chief Complaint   Patient presents with    Chest Injury       History of Illness: 19 y o  male who presents with a mild right pneumothorax of the Right lung after being kneed in the chest playing soccer on 11/23  Patient felt as though the wind were knocked out of him and on 11/24 his symptoms persisted and he presented to [facility] for evaluation for a broken rib   Due to nature or the ptx patient was trauma transfer for possible chest tube        ED Vital Signs:   ED Triage Vitals   Temperature Pulse Respirations Blood Pressure SpO2   11/24/18 2350 11/24/18 2350 11/24/18 2350 11/24/18 2350 11/24/18 2350   97 8 °F (36 6 °C) 74 18 140/88 99 %      Temp Source Heart Rate Source Patient Position - Orthostatic VS BP Location FiO2 (%)   11/24/18 2350 11/24/18 2350 11/25/18 0749 11/25/18 0100 --   Oral Monitor Lying Right arm       Pain Score       11/25/18 0034       No Pain        Wt Readings from Last 1 Encounters:   11/25/18 62 2 kg (137 lb 2 oz)       Vital Signs (abnormal):  WNL    Abnormal Labs/Diagnostic Test Results: WBC 11 43, RBC 5 65, Hct 51 9  Chest X-Ray: Mild right sided ptx    ED Treatment:   Medication Administration from 11/24/2018 2348 to 11/25/2018 0043       Date/Time Order Dose Route Action     11/25/2018 0034 acetaminophen (TYLENOL) tablet 975 mg 975 mg Oral Given       Past Medical/Surgical History:   No Additional Past Medical History       Admitting Diagnosis: Chest pain [R07 9]    Age/Sex: 19 y o  male    Assessment/Plan:   Trauma Active Problems: Right traumatic pneumothorax     Trauma Plan: Admit for observation and repeat cxr    Admission Orders:  Scheduled Meds:   acetaminophen 975 mg Oral Q8H [facility] & alf   ketorolac 15 mg Intravenous Q6H PRN     Reg diet  IS q 1h while awake  Encourage deep breathing & coughing

## 2018-11-26 ENCOUNTER — APPOINTMENT (INPATIENT)
Dept: RADIOLOGY | Facility: HOSPITAL | Age: 18
DRG: 200 | End: 2018-11-26
Payer: COMMERCIAL

## 2018-11-26 LAB
ATRIAL RATE: 71 BPM
P AXIS: 59 DEGREES
PR INTERVAL: 134 MS
QRS AXIS: 61 DEGREES
QRSD INTERVAL: 84 MS
QT INTERVAL: 384 MS
QTC INTERVAL: 417 MS
T WAVE AXIS: 52 DEGREES
VENTRICULAR RATE: 71 BPM

## 2018-11-26 PROCEDURE — 71046 X-RAY EXAM CHEST 2 VIEWS: CPT

## 2018-11-26 PROCEDURE — 71045 X-RAY EXAM CHEST 1 VIEW: CPT

## 2018-11-26 PROCEDURE — 99232 SBSQ HOSP IP/OBS MODERATE 35: CPT | Performed by: SURGERY

## 2018-11-26 PROCEDURE — 93010 ELECTROCARDIOGRAM REPORT: CPT | Performed by: INTERNAL MEDICINE

## 2018-11-26 PROCEDURE — 0W9930Z DRAINAGE OF RIGHT PLEURAL CAVITY WITH DRAINAGE DEVICE, PERCUTANEOUS APPROACH: ICD-10-PCS | Performed by: GENERAL PRACTICE

## 2018-11-26 PROCEDURE — 32551 INSERTION OF CHEST TUBE: CPT | Performed by: SURGERY

## 2018-11-26 RX ORDER — LIDOCAINE HYDROCHLORIDE 10 MG/ML
10 INJECTION, SOLUTION INFILTRATION; PERINEURAL ONCE
Status: COMPLETED | OUTPATIENT
Start: 2018-11-26 | End: 2018-11-26

## 2018-11-26 RX ORDER — OXYCODONE HYDROCHLORIDE 5 MG/1
2.5 TABLET ORAL EVERY 4 HOURS PRN
Status: DISCONTINUED | OUTPATIENT
Start: 2018-11-26 | End: 2018-11-28 | Stop reason: HOSPADM

## 2018-11-26 RX ORDER — FENTANYL CITRATE 50 UG/ML
50 INJECTION, SOLUTION INTRAMUSCULAR; INTRAVENOUS ONCE
Status: COMPLETED | OUTPATIENT
Start: 2018-11-26 | End: 2018-11-26

## 2018-11-26 RX ORDER — MIDAZOLAM HYDROCHLORIDE 1 MG/ML
1 INJECTION INTRAMUSCULAR; INTRAVENOUS ONCE
Status: COMPLETED | OUTPATIENT
Start: 2018-11-26 | End: 2018-11-26

## 2018-11-26 RX ORDER — OXYCODONE HYDROCHLORIDE 5 MG/1
5 TABLET ORAL EVERY 4 HOURS PRN
Status: DISCONTINUED | OUTPATIENT
Start: 2018-11-26 | End: 2018-11-28 | Stop reason: HOSPADM

## 2018-11-26 RX ORDER — MIDAZOLAM HYDROCHLORIDE 1 MG/ML
INJECTION INTRAMUSCULAR; INTRAVENOUS
Status: COMPLETED
Start: 2018-11-26 | End: 2018-11-26

## 2018-11-26 RX ORDER — LIDOCAINE 50 MG/G
1 PATCH TOPICAL DAILY
Status: DISCONTINUED | OUTPATIENT
Start: 2018-11-26 | End: 2018-11-28 | Stop reason: HOSPADM

## 2018-11-26 RX ORDER — FENTANYL CITRATE 50 UG/ML
INJECTION, SOLUTION INTRAMUSCULAR; INTRAVENOUS
Status: COMPLETED
Start: 2018-11-26 | End: 2018-11-26

## 2018-11-26 RX ORDER — METHOCARBAMOL 500 MG/1
500 TABLET, FILM COATED ORAL EVERY 6 HOURS PRN
Status: DISCONTINUED | OUTPATIENT
Start: 2018-11-26 | End: 2018-11-28 | Stop reason: HOSPADM

## 2018-11-26 RX ADMIN — OXYCODONE HYDROCHLORIDE 5 MG: 5 TABLET ORAL at 20:33

## 2018-11-26 RX ADMIN — METHOCARBAMOL 500 MG: 500 TABLET, FILM COATED ORAL at 20:33

## 2018-11-26 RX ADMIN — MIDAZOLAM 1 MG: 1 INJECTION INTRAMUSCULAR; INTRAVENOUS at 10:42

## 2018-11-26 RX ADMIN — FENTANYL CITRATE 50 MCG: 50 INJECTION, SOLUTION INTRAMUSCULAR; INTRAVENOUS at 10:31

## 2018-11-26 RX ADMIN — METHOCARBAMOL 500 MG: 500 TABLET, FILM COATED ORAL at 13:06

## 2018-11-26 RX ADMIN — FENTANYL CITRATE 50 MCG: 50 INJECTION INTRAMUSCULAR; INTRAVENOUS at 10:31

## 2018-11-26 RX ADMIN — MIDAZOLAM 1 MG: 1 INJECTION INTRAMUSCULAR; INTRAVENOUS at 10:31

## 2018-11-26 RX ADMIN — ACETAMINOPHEN 975 MG: 325 TABLET, FILM COATED ORAL at 22:03

## 2018-11-26 RX ADMIN — LIDOCAINE HYDROCHLORIDE 10 ML: 10 INJECTION, SOLUTION INFILTRATION; PERINEURAL at 10:28

## 2018-11-26 RX ADMIN — ENOXAPARIN SODIUM 40 MG: 40 INJECTION SUBCUTANEOUS at 09:18

## 2018-11-26 RX ADMIN — LIDOCAINE 1 PATCH: 50 PATCH TOPICAL at 13:06

## 2018-11-26 RX ADMIN — OXYCODONE HYDROCHLORIDE 5 MG: 5 TABLET ORAL at 12:09

## 2018-11-26 RX ADMIN — MIDAZOLAM HYDROCHLORIDE 1 MG: 1 INJECTION INTRAMUSCULAR; INTRAVENOUS at 10:31

## 2018-11-26 RX ADMIN — OXYCODONE HYDROCHLORIDE 5 MG: 5 TABLET ORAL at 16:12

## 2018-11-26 RX ADMIN — ACETAMINOPHEN 975 MG: 325 TABLET, FILM COATED ORAL at 13:06

## 2018-11-26 NOTE — PROGRESS NOTES
11/26/18 1400   Spiritual Beliefs/Perceptions   Support Systems Parent; Family members   Coping Responses   Patient Coping Other (Comment)   Family Coping Accepting;Open/discussion

## 2018-11-26 NOTE — ASSESSMENT & PLAN NOTE
- Follow-up repeat CXR today  - consider chest tube put  - continue to monitor respiratory status  - encourage IS and resp protocol  - tylenol and toradol for pain

## 2018-11-26 NOTE — PROGRESS NOTES
11/26/18 1400   Clinical Encounter Type   Visited With Patient and family together   Routine Visit Introduction   Continue Visiting Yes   Family Spiritual Encounters   Family Coping Accepting;Open/discussion

## 2018-11-26 NOTE — UTILIZATION REVIEW
Continued Stay Review    Date: 11/26    Vital Signs: /72   Pulse 79   Temp 98 °F (36 7 °C) (Oral)   Resp 16   Ht 5' 9" (1 753 m)   Wt 62 2 kg (137 lb 2 oz)   SpO2 98%   BMI 20 25 kg/m²      O2 2L nasal cannula    Medications:   Scheduled Meds:   Current Facility-Administered Medications:  acetaminophen 975 mg Oral Q8H LEONARD   enoxaparin 40 mg Subcutaneous Q24H [address]   lidocaine 1 patch Topical Daily     Continuous Infusions:  None      PRN Meds:     ketorolac  Methocarbamol po x1 dose  oxyCODONE po x1 dose      Abnormal Labs: No new    Diagnostic Results:     11/26 Xray chest pa & lat:  Right-sided pneumothorax is small-to-moderate in size and this gradually slightly increased in size over the past 2 days  11/26 PCXR s/p chest tube insertion:  Resolved right apical pneumothorax after right chest tube placement      Age/Sex: 19 y o  male     Assessment/Plan:     11/26:  S/P chest tube placement  / Follow up repeat CXR today; continue to monitor respiratory status; encourage Incentive spirometry; respiratory protocol; pain control; PT/OT eval and treat    Discharge Plan: TBD

## 2018-11-26 NOTE — PROGRESS NOTES
Progress Note Saul Orta 2000, 19 y o  male MRN: [id number]    Unit/Bed#: CW3 343-01 Encounter: [id number]    Primary Care Provider: Bobby Pack MD   Date and time admitted to hospital: 11/24/2018 11:49 PM        * Traumatic pneumothorax   Assessment & Plan    - Follow-up repeat CXR today  - consider chest tube put  - continue to monitor respiratory status  - encourage IS and resp protocol  - tylenol and toradol for pain       DVT prophylaxis: Lovenox and ambulation  PT and OT: eval and treat    Disposition: Await CXR results and determine whether or not patient requires Chest tube    Subjective/Objective   Chief Complaint: "No new complaints "    Subjective:  Patient offers no new complaints today on presentation  Reports he is feeling at his baseline  Denies any new chest pain or shortness of breath  Reports no cough or congestion  Denies any fevers, chills, sweats  Objective:     Meds/Allergies   No prescriptions prior to admission  Vitals: Blood pressure 132/79, pulse 58, temperature 98 °F (36 7 °C), temperature source Oral, resp  rate 16, height 5' 9" (1 753 m), weight 62 2 kg (137 lb 2 oz), SpO2 98 %  Body mass index is 20 25 kg/m²   SpO2: SpO2: 98 %, SpO2 Device: O2 Device: Nasal cannula    ABG: No results found for: PHART, XPV2AMU, PO2ART, TAA5VLW, O0KFIWQR, BEART, SOURCE      Intake/Output Summary (Last 24 hours) at 11/26/18 0756  Last data filed at 11/25/18 2132   Gross per 24 hour   Intake              598 ml   Output                0 ml   Net              598 ml       Invasive Devices     Peripheral Intravenous Line            Peripheral IV 11/24/18 Right Antecubital 1 day                Nutrition/GI Proph/Bowel Reg: continue current diet    Physical Exam:   GENERAL APPEARANCE: NAD, well-appearing  HEENT: normocephalic, PERRLA, EOMs intact  CV: RRR, no split S1/2  LUNGS:  Slightly decreased breath sounds on the right side; otherwise clear to auscultation bilaterally  ABD: Bowel sounds x4, nontender  EXT:  +2 pulses on extremities, neurovascularly intact  NEURO:  Cranial nerves 2-12 intact, no focal deficits  SKIN:  Warm, dry, intact    Lab Results: Results: I have personally reviewed pertinent reports   , BMP/CMP: No results found for: SODIUM, K, CL, CO2, ANIONGAP, BUN, CREATININE, GLUCOSE, CALCIUM, AST, ALT, ALKPHOS, PROT, BILITOT, EGFR and CBC: No results found for: WBC, HGB, HCT, MCV, PLT, ADJUSTEDWBC, MCH, MCHC, RDW, MPV, NRBC  Imaging/EKG Studies: Results: I have personally reviewed pertinent reports  Other Studies: no other studies  VTE Prophylaxis: Lovenox and ambulation    Nurse rounds completed, plan of care discussed  Family updated at bedside

## 2018-11-26 NOTE — PROCEDURES
Chest Tube Insertion  Date/Time: 11/26/2018 3:24 PM  Performed by: Kaylynn Almanza  Authorized by: Kaylynn Almanza     Patient location:  Bedside  Other Assisting Provider: Yes (comment) Liddie Kocher, [place])    Consent:     Consent obtained:  Written    Consent given by:  Patient    Risks discussed:  Bleeding, infection, pain and damage to surrounding structures    Alternatives discussed:  No treatment  Universal protocol:     Procedure explained and questions answered to patient or proxy's satisfaction: yes      Radiology Images displayed and confirmed  If images not available, report reviewed: yes      Required blood products, implants, devices, and special equipment available: yes      Immediately prior to procedure a time out was called: yes      Patient identity confirmed:  Verbally with patient, arm band and hospital-assigned identification number  Pre-procedure details:     Skin preparation:  ChloraPrep    Preparation: Patient was prepped and draped in the usual sterile fashion    Indications:     Indications: pneumothroax    Sedation:     Sedation type: Anxiolysis and moderate (conscious) sedation (See separate Procedural Sedation form) (Versed)  Anesthesia (see MAR for exact dosages): Anesthesia method:  Local infiltration    Local anesthetic:  Lidocaine 1% w/o epi  Procedure details:     Placement location:  Lateral    Laterality:  Right    Approach:  Open    Scalpel size:  15    Tube size (Fr):  20    Dissection instrument:  Finger and Gloria clamp    Ultrasound guidance: no      Tension pneumothorax: no      Needle Decompression: no      Tube connected to:  Suction    Drainage characteristics:  Air only    Suture material:  0 silk    Dressing:  4x4 sterile gauze and Xeroform gauze  Post-procedure details:     Post-insertion x-ray findings: tube in good position      Patient tolerance of procedure:   Tolerated well, no immediate complications

## 2018-11-27 ENCOUNTER — APPOINTMENT (INPATIENT)
Dept: RADIOLOGY | Facility: HOSPITAL | Age: 18
DRG: 200 | End: 2018-11-27
Payer: COMMERCIAL

## 2018-11-27 PROCEDURE — 99232 SBSQ HOSP IP/OBS MODERATE 35: CPT | Performed by: SURGERY

## 2018-11-27 PROCEDURE — 71045 X-RAY EXAM CHEST 1 VIEW: CPT

## 2018-11-27 RX ORDER — HYDROMORPHONE HCL/PF 1 MG/ML
0.2 SYRINGE (ML) INJECTION ONCE
Status: DISCONTINUED | OUTPATIENT
Start: 2018-11-27 | End: 2018-11-28 | Stop reason: HOSPADM

## 2018-11-27 RX ORDER — LORAZEPAM 2 MG/ML
0.5 INJECTION INTRAMUSCULAR ONCE
Status: COMPLETED | OUTPATIENT
Start: 2018-11-27 | End: 2018-11-27

## 2018-11-27 RX ADMIN — LORAZEPAM 0.5 MG: 2 INJECTION INTRAMUSCULAR; INTRAVENOUS at 22:15

## 2018-11-27 RX ADMIN — ACETAMINOPHEN 975 MG: 325 TABLET, FILM COATED ORAL at 06:28

## 2018-11-27 RX ADMIN — ACETAMINOPHEN 975 MG: 325 TABLET, FILM COATED ORAL at 13:00

## 2018-11-27 RX ADMIN — LIDOCAINE 1 PATCH: 50 PATCH TOPICAL at 08:07

## 2018-11-27 RX ADMIN — ENOXAPARIN SODIUM 40 MG: 40 INJECTION SUBCUTANEOUS at 08:07

## 2018-11-27 NOTE — UTILIZATION REVIEW
Continued Stay Review    Date: 11/27    Vital Signs: /82 (BP Location: Right arm)   Pulse 65   Temp 98 2 °F (36 8 °C) (Oral)   Resp 18   Ht 5' 9" (1 753 m)   Wt 62 2 kg (137 lb 2 oz)   SpO2 97%   BMI 20 25 kg/m²     O2 2L N/C    Medications:   Scheduled Meds:   Current Facility-Administered Medications:  acetaminophen 975 mg Oral Q8H LEONARD   enoxaparin 40 mg Subcutaneous Q24H [address]   lidocaine 1 patch Topical Daily     Continuous Infusions:    PRN Meds:   methocarbamol    oxyCODONE    Abnormal Labs/Diagnostic Results:   CXR - pending    Age/Sex: 19 y o  male     Assessment/Plan:   11/27 Progress notes:  Traumatic Pneumothorax  Follow-up repeat CXR today  PTX appears improved from previous  Place chest tube to water seal today  continue to monitor respiratory status  Pain control    Repeat CXR at 1800 today after moving CT to water seal around 1200     Discharge Plan: Home when medically cleared

## 2018-11-27 NOTE — ASSESSMENT & PLAN NOTE
- Follow-up repeat CXR today  PTX appears improved from previous  - Place chest tube to water seal today  - continue to monitor respiratory status  - encourage IS and resp protocol   Patient able to get to 2000 on IS  - tylenol and toradol for pain  - repeat CXR at 1800 today after moving CT to water seal around 1200

## 2018-11-27 NOTE — UTILIZATION REVIEW
[address]  Utilization Review Department  Phone: [phone]; Fax [phone]  [email or web address]  ATTENTION: Please call with any questions or concerns to [phone]  and carefully listen to the prompts so that you are directed to the right person  Send all requests for admission clinical reviews, approved or denied determinations and any other requests to fax [phone]  All voicemails are confidential       Initial Clinical Review    Admission: Date/Time/Statement: Observation 11/25 @ 0004 and changed to Inpatient on 11/25 @ 1609  Pt requiring further stay for Management and treatment of Traumatic Pneumothorax     Inpatient Admission     Standing Status:   Standing     Number of Occurrences:   1     Order Specific Question:   Admitting Physician     Answer:   Adriana Aleman [[id number]]     Order Specific Question:   Level of Care     Answer:   Med Surg [[id number]]     Order Specific Question:   Estimated length of stay     Answer:   More than 2 Midnights     Order Specific Question:   Certification     Answer:   I certify that inpatient services are medically necessary for this patient for a duration of greater than two midnights  See H&P and MD Progress Notes for additional information about the patient's course of treatment  ED: Date/Time/Mode of Arrival:  Tx from [address] [facility] ED  ED Arrival Information     Expected Arrival Acuity Means of Arrival Escorted By Service Admission Type    - 11/24/2018 23:49 Immediate Ambulance [facility] Trauma Urgent    Arrival Complaint    Traumatic Pneumothorax          Chief Complaint:   Chief Complaint   Patient presents with    Chest Injury       History of Illness: 19 y o  male who presents with a mild right pneumothorax of the Right lung after being kneed in the chest playing soccer on 11/23   Patient felt as though the wind were knocked out of him and on 11/24 his symptoms persisted and he presented to [facility] for evaluation for a broken rib  Due to nature or the ptx patient was trauma transfer for possible chest tube        ED Vital Signs:   ED Triage Vitals   Temperature Pulse Respirations Blood Pressure SpO2   11/24/18 2350 11/24/18 2350 11/24/18 2350 11/24/18 2350 11/24/18 2350   97 8 °F (36 6 °C) 74 18 140/88 99 %      Temp Source Heart Rate Source Patient Position - Orthostatic VS BP Location FiO2 (%)   11/24/18 2350 11/24/18 2350 11/25/18 0749 11/25/18 0100 --   Oral Monitor Lying Right arm       Pain Score       11/25/18 0034       No Pain          Wt Readings from Last 1 Encounters:   11/25/18 62 2 kg (137 lb 2 oz)       Vital Signs (abnormal):  WNL    Abnormal Labs/Diagnostic Test Results: WBC 11 43, RBC 5 65, Hct 51 9  Chest X-Ray: Mild right sided ptx    ED Treatment:   Medication Administration from 11/24/2018 2348 to 11/25/2018 0043       Date/Time Order Dose Route Action     11/25/2018 0034 acetaminophen (TYLENOL) tablet 975 mg 975 mg Oral Given       Past Medical/Surgical History:   No Additional Past Medical History       Admitting Diagnosis: Chest pain [R07 9]    Age/Sex: 19 y o  male    Assessment/Plan:   Trauma Active Problems: Right traumatic pneumothorax     Trauma Plan: Admit for observation and repeat cxr    Admission Orders:  Scheduled Meds:   acetaminophen 975 mg Oral Q8H [address]   ketorolac 15 mg Intravenous Q6H PRN     Reg diet  IS q 1h while awake  Encourage deep breathing & coughing    _________________________________________________________________________________________    11/26 Progress notes:  11/26:  S/P chest tube placement  / Follow up repeat CXR today; continue to monitor respiratory status; encourage Incentive spirometry; respiratory protocol; pain control; PT/OT eval and treat

## 2018-11-28 ENCOUNTER — APPOINTMENT (INPATIENT)
Dept: RADIOLOGY | Facility: HOSPITAL | Age: 18
DRG: 200 | End: 2018-11-28
Payer: COMMERCIAL

## 2018-11-28 VITALS
DIASTOLIC BLOOD PRESSURE: 92 MMHG | BODY MASS INDEX: 20.31 KG/M2 | WEIGHT: 137.13 LBS | HEIGHT: 69 IN | HEART RATE: 63 BPM | TEMPERATURE: 98.6 F | OXYGEN SATURATION: 100 % | RESPIRATION RATE: 18 BRPM | SYSTOLIC BLOOD PRESSURE: 133 MMHG

## 2018-11-28 PROCEDURE — 99238 HOSP IP/OBS DSCHRG MGMT 30/<: CPT | Performed by: SURGERY

## 2018-11-28 PROCEDURE — 71046 X-RAY EXAM CHEST 2 VIEWS: CPT

## 2018-11-28 RX ADMIN — ACETAMINOPHEN 975 MG: 325 TABLET, FILM COATED ORAL at 05:20

## 2018-11-28 RX ADMIN — ENOXAPARIN SODIUM 40 MG: 40 INJECTION SUBCUTANEOUS at 09:08

## 2018-11-28 NOTE — DISCHARGE INSTRUCTIONS
Seek medical attn if you develop worsening chest pain or shortness of breath, dizziness/lightheadness, fevers/chills or sweats  No heavy lifting, pushing or pulling >10 pounds  No strenuous physical activity  No work or driving while taking narcotic pain medications and until cleared by trauma  No flying in airplanes or scuba diving for 6 weeks  Traumatic Pneumothorax   WHAT YOU NEED TO KNOW:   A traumatic pneumothorax is when part of your lung collapses  A traumatic pneumothorax is caused by an injury that tears your lung and allows air to enter the pleural space  This is the area between your lungs and your chest wall  The air trapped in your pleural space prevents your lung from filling with air, which causes it to collapse  A pneumothorax can happen in one or both lungs  Injuries that cause a traumatic pneumothorax include bike accidents, motor vehicle accidents, gunshot wounds, and knife wounds  DISCHARGE INSTRUCTIONS:   Medicines:   · Pain medicine: You may be given medicine to take away or decrease pain  Do not wait until the pain is severe before you take your medicine  · Antibiotics: This medicine will help fight or prevent an infection  Take your antibiotics until they are gone, even if you feel better  · Take your medicine as directed  Contact your healthcare provider if you think your medicine is not helping or if you have side effects  Tell him of her if you are allergic to any medicine  Keep a list of the medicines, vitamins, and herbs you take  Include the amounts, and when and why you take them  Bring the list or the pill bottles to follow-up visits  Carry your medicine list with you in case of an emergency  Breathing exercises: You may need to do breathing exercises to strengthen your lungs  Ask your healthcare provider how to do these exercises, and how long you should do them  Do not smoke: If you smoke, it is never too late to quit   Ask for information about how to stop smoking if you need help  Follow up with your healthcare provider as directed: You may need to return for more chest x-rays  Write down your questions so you remember to ask them during your visits  For your safety:  Do not dive underwater or climb to high altitudes after a pneumothorax  Do not fly if you have an untreated or recurring pneumothorax  The change of pressure could cause another pneumothorax  Ask your healthcare provider when it is safe to fly, dive, or climb to high altitudes  Contact your healthcare provider if:   · You have new or worse signs and symptoms  · You have questions about your condition or care  Return to the emergency department if:   · You are sweating and feel like you are going to pass out  · Your fingernails, toenails, or lips begin to turn blue  · Your neck veins become larger than usual     · Your throat or the front of your neck is pushed to one side  · You have new or increased shortness of breath  © 2017 [facility] Information is for End User's use only and may not be sold, redistributed or otherwise used for commercial purposes  All illustrations and images included in CareNotes® are the copyrighted property of [employer] , Zachary Prell  or Sky Amaya  The above information is an  only  It is not intended as medical advice for individual conditions or treatments  Talk to your doctor, nurse or pharmacist before following any medical regimen to see if it is safe and effective for you

## 2018-11-28 NOTE — ASSESSMENT & PLAN NOTE
- Follow-up repeat CXR 11/28  PTX appeared resolved last night, discontinued chest tube 11/27  - continue to monitor respiratory status  - encourage IS and resp protocol   Patient able to get to 2000 on IS  - tylenol and toradol for pain

## 2018-11-28 NOTE — PROGRESS NOTES
Progress Note Ismael Wen 2000, 19 y o  male MRN: [id number]    Unit/Bed#: [facility] 905-01 Encounter: [id number]    Primary Care Provider: Marc Ritter MD   Date and time admitted to hospital: 11/24/2018 11:49 PM        * Traumatic pneumothorax   Assessment & Plan    - Follow-up repeat CXR 11/28  PTX appeared resolved last night, discontinued chest tube 11/27  - continue to monitor respiratory status  - encourage IS and resp protocol  Patient able to get to 2000 on IS  - tylenol and toradol for pain                 Subjective/Objective   Chief Complaint:  Mild right-sided chest pain    Subjective:  Patient feels really well after having his chest tube removed yesterday  He has had no issues with breathing  He is able to take full and deep breaths, his saturations are good, and he has no shortness of breath  He is tolerating his diet and having bowel movements as normal   The patient is eager for discharge today pending the result of his chest x-ray  He denies fevers, chills, nausea, and vomiting  Objective:   Meds/Allergies   No prescriptions prior to admission  Vitals: Blood pressure 127/88, pulse 59, temperature 98 6 °F (37 °C), temperature source Oral, resp  rate 18, height 5' 9" (1 753 m), weight 62 2 kg (137 lb 2 oz), SpO2 100 %  Body mass index is 20 25 kg/m²  SpO2: SpO2: 100 %    ABG: No results found for: PHART, UHZ3SYP, PO2ART, XET8ZGN, F8ULGJWY, BEART, SOURCE      Intake/Output Summary (Last 24 hours) at 11/28/18 0710  Last data filed at 11/28/18 8041   Gross per 24 hour   Intake              805 ml   Output                0 ml   Net              805 ml       Invasive Devices     Peripheral Intravenous Line            Peripheral IV 11/24/18 Right Antecubital 3 days                Nutrition/GI Proph/Bowel Reg:  House/none/none    Physical Exam:   General: No acute distress  HEENT: Normocephalic, atraumatic  Neck: Normal ROM   No tracheal deviation  Cardio: Normal rate, regular rhythm  Pulm: Normal respiratory effort  Clear to auscultation bilaterally  Occlusive dressing in place over right chest; it is clean, dry, and intact  Abdomen: Soft, non-tender, non-distended  Extremities: PRABHAKAR, No edema  Neuro: Cranial nerves II-XII intact  Psych: Normal affect      Lab Results: BMP/CMP: No results found for: SODIUM, K, CL, CO2, ANIONGAP, BUN, CREATININE, GLUCOSE, CALCIUM, AST, ALT, ALKPHOS, PROT, BILITOT, EGFR and CBC: No results found for: WBC, HGB, HCT, MCV, PLT, ADJUSTEDWBC, MCH, MCHC, RDW, MPV, NRBC  Imaging/EKG Studies: Results: I have personally reviewed pertinent reports     and I have personally reviewed pertinent films in PACS  Other Studies: n/a  VTE Prophylaxis:  Lovenox and SCDs

## 2018-11-28 NOTE — PLAN OF CARE
DISCHARGE PLANNING     Discharge to home or other facility with appropriate resources Progressing        INFECTION - ADULT     Absence or prevention of progression during hospitalization Progressing     Absence of fever/infection during neutropenic period Progressing        Knowledge Deficit     Patient/family/caregiver demonstrates understanding of disease process, treatment plan, medications, and discharge instructions Progressing        Nutrition/Hydration-ADULT     Nutrient/Hydration intake appropriate for improving, restoring or maintaining nutritional needs Progressing        PAIN - ADULT     Verbalizes/displays adequate comfort level or baseline comfort level Progressing        Potential for Falls     Patient will remain free of falls Progressing        RESPIRATORY - ADULT     Achieves optimal ventilation and oxygenation Progressing        SAFETY ADULT     Patient will remain free of falls Progressing     Maintain or return to baseline ADL function Progressing     Maintain or return mobility status to optimal level Progressing

## 2018-11-28 NOTE — ASSESSMENT & PLAN NOTE
- Repeat CXR on 11/28 appears unchanged  Patient is safe for discharge   - PTX appeared resolved last night, discontinued chest tube 11/27  - continue to monitor respiratory status  - encourage IS and resp protocol   Patient able to get to 2000 on IS  - tylenol and toradol for pain

## 2018-11-28 NOTE — PROGRESS NOTES
11/27/18    Procedure: Chest tube removal    Chest tube removed in routine fashion without incident  The patient tolerated the procedure well  A dry, sterile dressing was placed  Will check a pa/lat chest x-ray in the morning       Merlin Baker, MD

## 2018-12-05 ENCOUNTER — OFFICE VISIT (OUTPATIENT)
Dept: PEDIATRICS CLINIC | Facility: MEDICAL CENTER | Age: 18
End: 2018-12-05
Payer: COMMERCIAL

## 2018-12-05 VITALS — BODY MASS INDEX: 20.27 KG/M2 | HEIGHT: 67 IN | TEMPERATURE: 98.1 F | WEIGHT: 129.13 LBS

## 2018-12-05 DIAGNOSIS — Z09 FOLLOW UP: Primary | ICD-10-CM

## 2018-12-05 DIAGNOSIS — J93.9 PNEUMOTHORAX ON RIGHT: ICD-10-CM

## 2018-12-05 PROCEDURE — 3008F BODY MASS INDEX DOCD: CPT | Performed by: NURSE PRACTITIONER

## 2018-12-05 PROCEDURE — 99213 OFFICE O/P EST LOW 20 MIN: CPT | Performed by: NURSE PRACTITIONER

## 2018-12-05 NOTE — PROGRESS NOTES
Information given by: PATIENT    Chief Complaint   Patient presents with    Follow-up     for Pneumothorax         Subjective:     Patient ID: Ronel Andrew is a 19 y o  male    [facility] F/U  WAS ADMITTED X 5 DAYS D/T KICK TO RIGHT RIB DURING SOCCER- RIGHT PNEUMOTHORAX  HAD CHEST TUBE  DOING WELL  NO CONCERNS  NO FEVERS  EATING/DRINKING WELL  NO COUGH, NO HEMOPTYSIS  NO SOB        The following portions of the patient's history were reviewed and updated as appropriate: allergies, current medications, past family history, past medical history, past social history, past surgical history and problem list     Review of Systems   Constitutional: Negative for appetite change, fatigue and fever  HENT: Negative for rhinorrhea and sore throat  Respiratory: Negative for cough, chest tightness, shortness of breath and wheezing  Cardiovascular: Negative for chest pain, palpitations and leg swelling  Gastrointestinal: Negative for vomiting  Genitourinary: Negative for decreased urine volume  Musculoskeletal: Negative for gait problem  Skin: Negative for rash  Neurological: Negative for headaches  No past medical history on file  Social History     Social History    Marital status: Single     Spouse name: N/A    Number of children: N/A    Years of education: N/A     Occupational History    Not on file  Social History Main Topics    Smoking status: Light Tobacco Smoker     Types: E-Cigarettes    Smokeless tobacco: Never Used      Comment: Occasional smoker    Alcohol use 1 2 oz/week     2 Glasses of wine per week    Drug use: No    Sexual activity: Not on file     Other Topics Concern    Not on file     Social History Narrative    No narrative on file       Family History   Problem Relation Age of Onset    Adopted:  Yes    No Known Problems Mother     No Known Problems Father     Substance Abuse Paternal Grandmother     Mental illness Neg Hx         No Known Allergies    No current outpatient prescriptions on file prior to visit  No current facility-administered medications on file prior to visit  Objective:    Vitals:    12/05/18 1616   Temp: 98 1 °F (36 7 °C)   TempSrc: Oral   Weight: 58 6 kg (129 lb 2 oz)   Height: 5' 7" (1 702 m)       Physical Exam   Constitutional: He is oriented to person, place, and time  He appears well-developed and well-nourished  HENT:   Head: Normocephalic  Right Ear: External ear normal    Left Ear: External ear normal    Mouth/Throat: Oropharynx is clear and moist    Eyes: Conjunctivae are normal    Neck: Neck supple  Cardiovascular: Normal rate, regular rhythm and normal heart sounds  Pulmonary/Chest: Effort normal and breath sounds normal  No respiratory distress  He has no wheezes  Musculoskeletal: Normal range of motion  Neurological: He is alert and oriented to person, place, and time  Skin: Skin is warm  No rash noted  OCCLUSIVE DRESSING REMOVED FROM RIGHT CHEST WALL  INCISION FROM CHEST TUBE WITHOUT DRAINAGE, NO S/S INFECTION  NO ERYTHEMA   Psychiatric: He has a normal mood and affect  His behavior is normal  Judgment and thought content normal    Nursing note and vitals reviewed  Assessment/Plan:    1  Follow up     2  Pneumothorax on right         SYMPTOMS HAVE IMPROVED  CHEST TUBE SITE INTACT WITHOUT S/S INFECTION  CONTINUE INCENTIVE SPIROMETRY  KEEP TRAUMA F/U ON December 11        Instructions: Follow up if no improvement, symptoms worsen and/or problems with treatment plan  Requested call back or appointment if any questions or problems

## 2018-12-05 NOTE — PATIENT INSTRUCTIONS
Traumatic Pneumothorax   WHAT YOU NEED TO KNOW:   What is a traumatic pneumothorax and what causes it? A traumatic pneumothorax is when part of your lung collapses  A traumatic pneumothorax is caused by an injury that tears your lung and allows air to enter the pleural space  This is the area between your lungs and your chest wall  The air trapped in your pleural space prevents your lung from filling with air, which causes it to collapse  A pneumothorax can happen in one or both lungs  Injuries that cause a traumatic pneumothorax include:  · A bicycle accident or a fall    · A motor vehicle accident    · An accidental injury that happens during a medical procedure, such as a lung biopsy    · A gunshot wound    · A knife injury  What are the signs and symptoms of a traumatic pneumothorax? You may have one or more of the following:  · Shortness of breath    · Soft bulges under your skin caused by air bubbles  These bubbles may be found anywhere, such as your chest or neck  · Chest pain    · Uneven chest movement when you breathe    · Rapid heartbeat    · A cough  How is a traumatic pneumothorax diagnosed? Your healthcare provider will ask about your signs and symptoms and examine you  He will listen to your lungs  You may need any of the following tests:  · Blood tests: You may need blood taken to give caregivers information about how your body is working  The blood may be taken from your hand, arm, or IV  · Blood gases: This is also called an arterial blood gas, or ABG  Blood is taken from an artery (blood vessel) in your wrist, arm, or groin  Your blood is tested for the amount of oxygen and carbon dioxide in it  The results can tell caregivers how well your lungs are working  · Heart monitor: This is also called an ECG or EKG  Sticky pads placed on your skin record your heart's electrical activity  · Chest x-ray:   This is a picture of the bones, lungs, and other tissues in your chest  Healthcare providers use chest x-rays to see if you have broken ribs  These x-rays may show your healthcare provider how large your pneumothorax is  Chest x-rays may also show fluid around the heart and lungs  · CT scan: This test is also called a CAT scan  An x-ray machine uses a computer to take pictures of your chest and lungs  Healthcare providers check for a pneumothorax that did not show up on a chest x-ray  You may be given dye in your IV to help your healthcare providers see the images better  Tell the healthcare provider if you are allergic to shellfish or iodine  You may also be allergic to the dye  · Ultrasound: An ultrasound uses sound waves to show pictures of your lungs on a monitor  How is a traumatic pneumothorax treated? Treatment will depend on the size of your pneumothorax  If your pneumothorax is small, it may resolve on its own  The goal of treatment is to remove the air from your pleural space  Once your lung is able to fill with air, you will be able to breathe easier  You may need any of the following:  · Medicines:      ¨ Antibiotics: This medicine is given to help treat or prevent an infection caused by bacteria  ¨ Pain medicine: You may be given a prescription medicine to decrease severe pain if other pain medicines do not work  Take the medicine as directed  Do not wait until the pain is severe before you take your medicine  ¨ Sedative: This medicine is given to help you stay calm and relaxed  · Procedures: You may receive a shot in your skin of medicine called local anesthesia  This is given to numb the area and dull your pain so you feel more comfortable during your procedure or surgery  You may still feel pressure or pushing after you get this medicine  ¨ Needle aspiration:  During needle aspiration, a needle covered with a tube is put through your skin and into your pleural space   Your healthcare provider will use a syringe to pull the air out of your pleural space  ¨ Chest tube:  A chest tube may be placed to remove air, blood, or fluid from around your collapsed lung  This allows your lung to fill up with air when you breathe, and helps your heart beat normally  The chest tube is attached to a container to remove air and collect any blood or fluid  ¨ Pleurodesis:  Healthcare providers use chemicals, such as iodine or talc powder, to irritate the walls of your pleural space  This causes the walls of your pleural space to close together so air can no longer be trapped there  ¨ Video-assisted thoracoscopic surgery:  Video-assisted thoracoscopic surgery (VATS) is surgery done to look inside your chest with a video scope  During surgery, 2 to 3 small cuts are made between your ribs  The video scope and other special instruments are inserted into these incisions to repair your lung  ¨ Thoracotomy:  During a thoracotomy, an incision is made in your chest to repair your lung  What are the risks of a traumatic pneumothorax? · Air may continue to enter the pleural space and you may get a tension pneumothorax  With a tension pneumothorax, the injured lung and trapped air push against your uninjured lung  A tension pneumothorax can lead to low blood pressure, decreased oxygen in your blood, and heart problems  · Treatments that require cuts in your skin to reach your lung may lead to an infection in the lung area  During decompression, the needle used for the procedure may injure your lung or cut a blood vessel  This can cause bleeding around the lung  Chest tubes could be painful, become kinked or clogged, or could fall out or leak  Chest tubes could also cause a lung infection, or fluid could fill the lung instead of air  Chest tubes could damage blood vessels or other organs and cause bleeding and injury  · Without treatment, your lungs may completely collapse  Your heart could stop working  These risks can be life-threatening    When should I contact my healthcare provider? · You have new or worse signs and symptoms  · You have questions about your condition or care  When should I seek immediate care or call 911? · You have new or increased shortness of breath  · Your throat or the front of your neck is pushed to one side  · You are sweating and feel like you are going to pass out  · Your fingernails, toenails, or lips begin to turn blue  · Your neck veins become larger than usual   CARE AGREEMENT:   You have the right to help plan your care  Learn about your health condition and how it may be treated  Discuss treatment options with your caregivers to decide what care you want to receive  You always have the right to refuse treatment  The above information is an  only  It is not intended as medical advice for individual conditions or treatments  Talk to your doctor, nurse or pharmacist before following any medical regimen to see if it is safe and effective for you  © 2017 [employer] Information is for End User's use only and may not be sold, redistributed or otherwise used for commercial purposes  All illustrations and images included in CareNotes® are the copyrighted property of [employer]  or [employer]

## 2018-12-11 ENCOUNTER — OFFICE VISIT (OUTPATIENT)
Dept: SURGERY | Facility: CLINIC | Age: 18
End: 2018-12-11
Payer: COMMERCIAL

## 2018-12-11 ENCOUNTER — HOSPITAL ENCOUNTER (OUTPATIENT)
Dept: RADIOLOGY | Facility: HOSPITAL | Age: 18
Discharge: HOME | End: 2018-12-11
Payer: COMMERCIAL

## 2018-12-11 ENCOUNTER — TRANSCRIBE ORDERS (OUTPATIENT)
Dept: RADIOLOGY | Facility: HOSPITAL | Age: 18
End: 2018-12-11

## 2018-12-11 ENCOUNTER — OFFICE VISIT (OUTPATIENT)
Dept: URGENT CARE | Facility: CLINIC | Age: 18
End: 2018-12-11
Payer: COMMERCIAL

## 2018-12-11 VITALS
BODY MASS INDEX: 20.91 KG/M2 | DIASTOLIC BLOOD PRESSURE: 98 MMHG | HEIGHT: 67 IN | SYSTOLIC BLOOD PRESSURE: 142 MMHG | TEMPERATURE: 96.9 F | WEIGHT: 133.2 LBS

## 2018-12-11 VITALS
OXYGEN SATURATION: 99 % | RESPIRATION RATE: 16 BRPM | TEMPERATURE: 96.6 F | WEIGHT: 130 LBS | BODY MASS INDEX: 19.7 KG/M2 | DIASTOLIC BLOOD PRESSURE: 80 MMHG | SYSTOLIC BLOOD PRESSURE: 124 MMHG | HEIGHT: 68 IN | HEART RATE: 64 BPM

## 2018-12-11 DIAGNOSIS — S27.0XXD TRAUMATIC PNEUMOTHORAX, SUBSEQUENT ENCOUNTER: ICD-10-CM

## 2018-12-11 DIAGNOSIS — S27.0XXD TRAUMATIC PNEUMOTHORAX, SUBSEQUENT ENCOUNTER: Primary | ICD-10-CM

## 2018-12-11 DIAGNOSIS — R11.2 NAUSEA AND VOMITING, INTRACTABILITY OF VOMITING NOT SPECIFIED, UNSPECIFIED VOMITING TYPE: Primary | ICD-10-CM

## 2018-12-11 PROCEDURE — 99213 OFFICE O/P EST LOW 20 MIN: CPT | Performed by: SURGERY

## 2018-12-11 PROCEDURE — 99213 OFFICE O/P EST LOW 20 MIN: CPT | Performed by: PHYSICIAN ASSISTANT

## 2018-12-11 PROCEDURE — 71046 X-RAY EXAM CHEST 2 VIEWS: CPT

## 2018-12-11 RX ORDER — ONDANSETRON 4 MG/1
8 TABLET, ORALLY DISINTEGRATING ORAL ONCE
Status: COMPLETED | OUTPATIENT
Start: 2018-12-11 | End: 2018-12-11

## 2018-12-11 RX ORDER — ONDANSETRON 8 MG/1
8 TABLET, ORALLY DISINTEGRATING ORAL EVERY 8 HOURS PRN
Qty: 30 TABLET | Refills: 0 | Status: SHIPPED | OUTPATIENT
Start: 2018-12-11

## 2018-12-11 RX ADMIN — ONDANSETRON 8 MG: 4 TABLET, ORALLY DISINTEGRATING ORAL at 17:28

## 2018-12-11 NOTE — ASSESSMENT & PLAN NOTE
-Repeat chest x-ray completed on 12/11/2018  -follow-up final results  -patient on examination has a saturation of 99%  -continues to perform incentive spirometry and ascertained [id number]  -asymptomatic on exam  -pain is well controlled  -continue out of work for another 2 weeks secondary to lifting requirements of work  -if repeat chest x-ray stable and shows no pneumothorax will clear patient to return to work in 2 weeks from 12/11/2018

## 2018-12-11 NOTE — PROGRESS NOTES
Office Visit - trauma  Kristi Valencia MRN: [id number]  Encounter: [id number]    Assessment and Plan    Problem List Items Addressed This Visit     Traumatic pneumothorax - Primary     -Repeat chest x-ray completed on 12/11/2018  -follow-up final results  -patient on examination has a saturation of 99%  -continues to perform incentive spirometry and ascertained [id number]  -asymptomatic on exam  -pain is well controlled  -continue out of work for another 2 weeks secondary to lifting requirements of work  -if repeat chest x-ray stable and shows no pneumothorax will clear patient to return to work in 2 weeks from 12/11/2018         Relevant Orders    XR chest pa & lateral        Disposition:  Follow-up x-ray  Chief Complaint:  Kristi Valencia is a 19 y o  male who presents for sport injury (f/u abdominal injury)    Subjective  Patient offers no new complaints today on presentation  Denies any new chest pain or shortness of breath  Denies any new pain  Reports he is doing well at home  Reports no dyspnea on exertion  Denies any new fevers, chills, sweats  Denies any cough or congestion  Past Medical History  History reviewed  No pertinent past medical history  Past Surgical History  Past Surgical History:   Procedure Laterality Date    HERNIA REPAIR      WISDOM TOOTH EXTRACTION         Family History  Family History   Problem Relation Age of Onset    Adopted: Yes    No Known Problems Mother     No Known Problems Father     Substance Abuse Paternal Grandmother     Mental illness Neg Hx        Medications  No current outpatient prescriptions on file prior to visit  No current facility-administered medications on file prior to visit  Allergies  No Known Allergies    Review of Systems   Constitutional: Negative for activity change, appetite change and fever  HENT: Negative for ear discharge, ear pain, rhinorrhea, sore throat and trouble swallowing      Eyes: Negative for photophobia, pain and redness  Respiratory: Negative for apnea, cough, chest tightness, shortness of breath and stridor  Cardiovascular: Negative for chest pain and palpitations  Gastrointestinal: Negative for abdominal distention, abdominal pain, nausea and vomiting  Endocrine: Negative for cold intolerance and heat intolerance  Genitourinary: Negative  Musculoskeletal: Negative for arthralgias, back pain, neck pain and neck stiffness  Skin: Negative  Neurological: Negative for dizziness, weakness, light-headedness and numbness  Hematological: Negative  Objective  Vitals:    12/11/18 1330   BP: 142/98   Temp: (!) 96 9 °F (36 1 °C)       Physical Exam   Constitutional: He is oriented to person, place, and time  He appears well-developed and well-nourished  No distress  HENT:   Head: Normocephalic and atraumatic  Eyes: Pupils are equal, round, and reactive to light  Conjunctivae and EOM are normal    Neck: Normal range of motion  Neck supple  Cardiovascular: Normal rate, regular rhythm, normal heart sounds and intact distal pulses  Pulmonary/Chest: Effort normal and breath sounds normal  No respiratory distress  He has no wheezes  He exhibits no tenderness  Abdominal: Soft  Bowel sounds are normal  He exhibits no distension  There is no tenderness  Musculoskeletal: Normal range of motion  He exhibits no edema or deformity  Neurological: He is alert and oriented to person, place, and time  No cranial nerve deficit  Skin: Skin is warm and dry  He is not diaphoretic  No erythema  Vitals reviewed

## 2018-12-11 NOTE — PROGRESS NOTES
[facility]        NAME: Km Dewey is a 19 y o  male  : 2000    MRN: [id number]  DATE: 2018  TIME: 5:49 PM    Assessment and Plan   Nausea and vomiting, intractability of vomiting not specified, unspecified vomiting type [R11 2]  1  Nausea and vomiting, intractability of vomiting not specified, unspecified vomiting type  ondansetron (ZOFRAN-ODT) dispersible tablet 8 mg    ondansetron (ZOFRAN-ODT) 8 mg disintegrating tablet         Patient Instructions       Belly is tender but soft  He has no peritoneal signs  I discussed that if symptoms get worse or localize he should go to the emergency room  We will use Zofran over-the-counter for the nausea vomiting  Fluids and bland diet  He improved with Zofran in the office  Follow up with PCP in 3-5 days  Proceed to  ER if symptoms worsen  Chief Complaint     Chief Complaint   Patient presents with    Abdominal Pain     x today, mid  abd pain    Vomiting    Nausea         History of Present Illness         19year-old male complains of nausea vomiting and abdominal pain today  He says belly pains around the middle of his abdomen  No fever or chills home  No diarrhea  He reports vomiting after eating  He has kept down some fluids but not much today  No blood in his stool  No recent travel or new foods  Denies history of abdominal surgery        Review of Systems   Review of Systems      Current Medications       Current Outpatient Prescriptions:     ondansetron (ZOFRAN-ODT) 8 mg disintegrating tablet, Take 1 tablet (8 mg total) by mouth every 8 (eight) hours as needed for nausea or vomiting, Disp: 30 tablet, Rfl: 0  No current facility-administered medications for this visit       Current Allergies     Allergies as of 2018    (No Known Allergies)            The following portions of the patient's history were reviewed and updated as appropriate: allergies, current medications, past family history, past medical history, past social history, past surgical history and problem list      Past Medical History:   Diagnosis Date    Pneumothorax        Past Surgical History:   Procedure Laterality Date    HERNIA REPAIR      WISDOM TOOTH EXTRACTION         Family History   Problem Relation Age of Onset    Adopted: Yes    No Known Problems Mother     No Known Problems Father     Substance Abuse Paternal Grandmother     Mental illness Neg Hx          Medications have been verified  Objective   /80 (BP Location: Left arm, Patient Position: Sitting, Cuff Size: Standard)   Pulse 64   Temp (!) 96 6 °F (35 9 °C) (Tympanic)   Resp 16   Ht 5' 8" (1 727 m)   Wt 59 kg (130 lb)   SpO2 99%   BMI 19 77 kg/m²        Physical Exam     Physical Exam   Constitutional: He appears well-developed and well-nourished  No distress  HENT:   Right Ear: Tympanic membrane, external ear and ear canal normal    Left Ear: Tympanic membrane, external ear and ear canal normal    Nose: Nose normal  Right sinus exhibits no maxillary sinus tenderness and no frontal sinus tenderness  Left sinus exhibits no maxillary sinus tenderness and no frontal sinus tenderness  Mouth/Throat: Oropharynx is clear and moist  No posterior oropharyngeal erythema  Eyes: Pupils are equal, round, and reactive to light  Conjunctivae and EOM are normal  No scleral icterus  Neck: Normal range of motion  Neck supple  Cardiovascular: Normal rate, regular rhythm and normal heart sounds  Pulmonary/Chest: Effort normal and breath sounds normal  No respiratory distress  He has no wheezes  He has no rales  Abdominal: Soft  Bowel sounds are normal  He exhibits no distension and no mass  There is tenderness in the right lower quadrant  There is no rigidity, no rebound, no guarding and no tenderness at McBurney's point  No peritoneal signs  He is not painful when I bump the table or shake his hips  No pain with psoas or obturator signs  Lymphadenopathy:     He has no cervical adenopathy  Skin: Skin is warm and dry  No rash noted

## 2018-12-11 NOTE — PATIENT INSTRUCTIONS
Belly is tender but soft  He has no peritoneal signs  I discussed that if symptoms get worse or localize he should go to the emergency room  We will use Zofran over-the-counter for the nausea vomiting  Fluids and bland diet  Follow up with PCP in 3-5 days  Proceed to  ER if symptoms worsen

## 2019-01-09 ENCOUNTER — TELEPHONE (OUTPATIENT)
Dept: SURGERY | Facility: CLINIC | Age: 19
End: 2019-01-09

## 2019-01-09 NOTE — LETTER
January 9, 2019     Patient: Rosalba Rowe   YOB: 2000   Date of Visit: 1/9/2019       To Whom it May Concern:    Rosalba Rowe is under my professional care  He may return to work on 1/10/19 to full duty with no restrictions       If you have any questions or concerns, please don't hesitate to call           Sincerely,          Gaby SINGLETARY         CC: No Recipients

## 2019-01-09 NOTE — TELEPHONE ENCOUNTER
Patient called and asked if he is able to go back to work lifting more than 20 lbs? If this is okay, patient would need a return to work letter stating he is able to go back full duty

## 2019-01-09 NOTE — TELEPHONE ENCOUNTER
Work letter created and mailed at Publ request  Elmer Barksdale to return to work as early as tomorrow

## 2021-12-11 ENCOUNTER — NURSE TRIAGE (OUTPATIENT)
Dept: OTHER | Facility: OTHER | Age: 21
End: 2021-12-11

## 2021-12-12 ENCOUNTER — NURSE TRIAGE (OUTPATIENT)
Dept: OTHER | Facility: OTHER | Age: 21
End: 2021-12-12

## 2021-12-12 DIAGNOSIS — Z11.59 SPECIAL SCREENING EXAMINATION FOR VIRAL DISEASE: Primary | ICD-10-CM

## 2021-12-13 PROCEDURE — U0005 INFEC AGEN DETEC AMPLI PROBE: HCPCS | Performed by: FAMILY MEDICINE

## 2021-12-13 PROCEDURE — U0003 INFECTIOUS AGENT DETECTION BY NUCLEIC ACID (DNA OR RNA); SEVERE ACUTE RESPIRATORY SYNDROME CORONAVIRUS 2 (SARS-COV-2) (CORONAVIRUS DISEASE [COVID-19]), AMPLIFIED PROBE TECHNIQUE, MAKING USE OF HIGH THROUGHPUT TECHNOLOGIES AS DESCRIBED BY CMS-2020-01-R: HCPCS | Performed by: FAMILY MEDICINE

## 2022-01-10 ENCOUNTER — NURSE TRIAGE (OUTPATIENT)
Dept: OTHER | Facility: OTHER | Age: 22
End: 2022-01-10

## 2022-01-10 NOTE — TELEPHONE ENCOUNTER
Nurse reached out to discuss call to [facility]  line for possible Covid-19 triage  Phone rings then music played for full minute  No option to LM or VM greeting  Phone continued in same cycle  No answer- nurse disconnected call

## 2022-01-10 NOTE — TELEPHONE ENCOUNTER
Regarding: covid symptomatic-loss of taste and smell  ----- Message from Farrukh Forde sent at 1/10/2022  3:48 PM EST -----  " I have lost my taste and smell "

## 2022-02-13 NOTE — TELEPHONE ENCOUNTER
Regarding: COVID-Symptomatic-Loss of Taste-Smell  ----- Message from Nayana Ackerman sent at 1/9/2022  1:00 PM EST -----  " I have lost my taste and smell " none

## 2022-06-22 NOTE — PROGRESS NOTES
Progress Note Jonah Espinosa 2000, 19 y o  male MRN: [id number]    Unit/Bed#: [facility] 918-01 Encounter: [id number]    Primary Care Provider: Adams Rader MD   Date and time admitted to hospital: 11/24/2018 11:49 PM        * Traumatic pneumothorax   Assessment & Plan    - Follow-up repeat CXR today  PTX appears improved from previous  - Place chest tube to water seal today  - continue to monitor respiratory status  - encourage IS and resp protocol  Patient able to get to 2000 on IS  - tylenol and toradol for pain  - repeat CXR at 1800 today after moving CT to water seal around 1200               Subjective/Objective   Chief Complaint: Patient states it feels uncomfortable to not have the nasal cannula in when he breathes  He is saturating >98% when off nasal cannula but prefers to have it on for comfort  Subjective: Patient denies any pain currently and other than feeling "uncomfortable" without nasal cannula he has no complaints  He denies any CP or SOB  Objective:     Meds/Allergies   No prescriptions prior to admission  Vitals: Blood pressure 120/82, pulse 65, temperature 98 2 °F (36 8 °C), temperature source Oral, resp  rate 18, height 5' 9" (1 753 m), weight 62 2 kg (137 lb 2 oz), SpO2 97 %  Body mass index is 20 25 kg/m²  SpO2: SpO2: 97 %    ABG: No results found for: PHART, NRX0TSC, PO2ART, SPS3MEW, D1NMERIC, BEART, SOURCE      Intake/Output Summary (Last 24 hours) at 11/27/18 1358  Last data filed at 11/27/18 0900   Gross per 24 hour   Intake              800 ml   Output                0 ml   Net              800 ml       Invasive Devices     Peripheral Intravenous Line            Peripheral IV 11/24/18 Right Antecubital 2 days          Drain            Chest Tube Right 1 day                Nutrition/GI Proph/Bowel Reg: regular house / none / none     Physical Exam   Constitutional: He is oriented to person, place, and time  He appears well-developed and well-nourished  No distress     HENT: Head: Normocephalic and atraumatic  Eyes: Pupils are equal, round, and reactive to light  Conjunctivae are normal    Neck: Normal range of motion  Neck supple  No JVD present  Cardiovascular: Normal rate, regular rhythm and normal heart sounds  Exam reveals no gallop and no friction rub  No murmur heard  Pulmonary/Chest: Effort normal and breath sounds normal  No stridor  No respiratory distress  He has no wheezes  He has no rales  Chest tube currently to suction this morning with no air leak  Patient able to get 2000ml on IS   Abdominal: Soft  Bowel sounds are normal  He exhibits no distension  There is no tenderness  There is no guarding  Musculoskeletal: Normal range of motion  He exhibits no edema, tenderness or deformity  Neurological: He is alert and oriented to person, place, and time  No cranial nerve deficit or sensory deficit  He exhibits normal muscle tone  Skin: Skin is warm and dry  No rash noted  He is not diaphoretic  No erythema  No pallor  Psychiatric: He has a normal mood and affect  His behavior is normal    Nursing note and vitals reviewed  Lab Results: Results: I have personally reviewed pertinent reports  Imaging/EKG Studies: Results: I have personally reviewed pertinent reports      Other Studies: N/A  VTE Prophylaxis: lovenox No